# Patient Record
Sex: MALE | Race: OTHER
[De-identification: names, ages, dates, MRNs, and addresses within clinical notes are randomized per-mention and may not be internally consistent; named-entity substitution may affect disease eponyms.]

---

## 2017-03-01 ENCOUNTER — HOSPITAL ENCOUNTER (INPATIENT)
Dept: HOSPITAL 80 - FED | Age: 52
LOS: 9 days | Discharge: HOME | DRG: 674 | End: 2017-03-10
Attending: INTERNAL MEDICINE | Admitting: INTERNAL MEDICINE
Payer: COMMERCIAL

## 2017-03-01 DIAGNOSIS — I12.0: ICD-10-CM

## 2017-03-01 DIAGNOSIS — N18.6: Primary | ICD-10-CM

## 2017-03-01 DIAGNOSIS — D63.8: ICD-10-CM

## 2017-03-01 DIAGNOSIS — Z79.4: ICD-10-CM

## 2017-03-01 DIAGNOSIS — E10.69: ICD-10-CM

## 2017-03-01 DIAGNOSIS — E10.21: ICD-10-CM

## 2017-03-01 DIAGNOSIS — N25.81: ICD-10-CM

## 2017-03-01 LAB
% IMMATURE GRANULYOCYTES: 0.6 % (ref 0–1.1)
ABSOLUTE IMMATURE GRANULOCYTES: 0.06 10^3/UL (ref 0–0.1)
ABSOLUTE NRBC COUNT: 0 10^3/UL (ref 0–0.01)
ADD DIFF?: NO
ADD MORPH?: YES
ADD SCAN?: NO
ALBUMIN SERPL-MCNC: 3.1 G/DL (ref 3.5–5)
ALP SERPL-CCNC: 90 IU/L (ref 38–126)
ALT SERPL-CCNC: 47 IU/L (ref 21–72)
ANION GAP SERPL CALC-SCNC: 19 MEQ/L (ref 8–16)
AST SERPL-CCNC: 44 IU/L (ref 17–59)
ATYPICAL LYMPHOCYTE FLAG: 0 (ref 0–99)
BASE EXCESS BLDA CALC-SCNC: -10.1 MEQ/L (ref -2.5–2.5)
BILIRUB SERPL-MCNC: 0.5 MG/DL (ref 0.1–1.4)
BILIRUBIN-CONJUGATED: 0.5 MG/DL (ref 0–0.5)
BILIRUBIN-UNCONJUGATED: 0 MG/DL (ref 0–1.1)
CALCIUM SERPL-MCNC: 5.1 MG/DL (ref 8.5–10.4)
CHLORIDE SERPL-SCNC: 103 MEQ/L (ref 97–110)
CK-MB INTERPRETATION: NEGATIVE
CMV DNA SERPL NAA+PROBE-ACNC: 96 % (ref 92–95)
CO2 SERPL-SCNC: 15 MEQ/L (ref 22–31)
CREAT SERPL-MCNC: 19 MG/DL (ref 0.7–1.3)
CREATINE KINASE-MB FRACTION: 23.1 NG/ML (ref 0–3.19)
ERYTHROCYTE [DISTWIDTH] IN BLOOD BY AUTOMATED COUNT: 13.6 % (ref 11.5–15.2)
FERRITIN SERPL-MCNC: 273 NG/ML (ref 17.9–464)
FRAGMENT RBC FLAG: 0 (ref 0–99)
GFR SERPL CREATININE-BSD FRML MDRD: 3 ML/MIN/{1.73_M2}
GLUCOSE SERPL-MCNC: 308 MG/DL (ref 70–100)
HCO3 BLD-SCNC: 14 MEQ/L (ref 22–26)
HCT VFR BLD CALC: 18.5 % (ref 40–51)
HGB BLD-MCNC: 6.2 G/DL (ref 13.7–17.5)
HYPOCHROMIA BLD QL SMEAR: (no result)
INR PPP: 1.28 (ref 0.83–1.16)
IRON SERPL-MCNC: 51 MCG/DL (ref 49–199)
LEFT SHIFT FLG: 0 (ref 0–99)
LIPEMIA HEMOLYSIS FLAG: 80 (ref 0–99)
MACROCYTES: (no result)
MCH RBC BLDCO QN: 30.7 PG (ref 27.9–34.1)
MCHC RBC AUTO-ENTMCNC: 33.5 G/DL (ref 32.4–36.7)
MCV RBC AUTO: 91.6 FL (ref 81.5–99.8)
MICROCYTES: (no result)
NRBC-AUTO%: 0 % (ref 0–0.2)
O2 CONCENTRATION LITERS: 2 LITERS
PCO2 BLD: 26 MMHG (ref 34–38)
PLATELET # BLD EST: ADEQUATE 10*3/UL
PLATELET # BLD: 269 10^3/UL (ref 150–400)
PLATELET CLUMPS FLAG: 0 (ref 0–99)
PMV BLD AUTO: 9.3 FL (ref 8.7–11.7)
PO2 BLD: 85 MMHG (ref 65–75)
POTASSIUM SERPL-SCNC: 6.2 MEQ/L (ref 3.5–5.2)
PROT SERPL-MCNC: 6.1 G/DL (ref 6.3–8.2)
PROTHROMBIN TIME: 16 SEC (ref 12–15)
RBC # BLD AUTO: 2.02 10^6/UL (ref 4.4–6.38)
SAO2 % BLD FROM PO2: 23 % (ref 20–55)
SODIUM SERPL-SCNC: 137 MEQ/L (ref 134–144)
TCO2: 15 MEQ/L (ref 23–27)
TIBC SERPL-MCNC: 226 UG/DL (ref 260–490)
TROPONIN I SERPL-MCNC: 0.04 NG/ML (ref 0–0.03)

## 2017-03-01 PROCEDURE — C1750 CATH, HEMODIALYSIS,LONG-TERM: HCPCS

## 2017-03-01 PROCEDURE — P9016 RBC LEUKOCYTES REDUCED: HCPCS

## 2017-03-01 PROCEDURE — G0472 HEP C SCREEN HIGH RISK/OTHER: HCPCS

## 2017-03-01 PROCEDURE — P9021 RED BLOOD CELLS UNIT: HCPCS

## 2017-03-01 NOTE — EDPHY
H & P


Stated Complaint: labs at pc/anemic/leg swelling/weak


Source: Patient, Family, 


Exam Limitations: Language barrier





- Personal History


Current Tetanus/Diphtheria Vaccine: Yes





- Medical/Surgical History


Hx Asthma: No


Hx Chronic Respiratory Disease: No


Hx Diabetes: Yes


Hx Cardiac Disease: No


Hx Renal Disease: Yes


Hx Cirrhosis: No


Hx Alcoholism: No


Hx HIV/AIDS: No


Hx Splenectomy or Spleen Trauma: No


Other PMH: Diabetes





- Social History


Smoking Status: Never smoked


Alcohol Use: None


Drug Use: None


Time Seen by Provider: 03/01/17 17:46


HPI/ROS: 





CHIEF COMPLAINT: Sent by [facility]





HISTORY OF PRESENT ILLNESS:  51-year-old insulin-dependent diabetic presents to 

the emergency department sent by [facility].  Patient was seen yesterday 

for a 5 day history of weakness, leg swelling, fatigue and shortness of breath. 

Patient reports abdominal distention and black stools over the last 5 days.  He 

reports a normal appetite, no nausea or vomiting, no abdominal pain, no 

diarrhea.  Patient feels cold, subjective fevers and chills. No cough.  Patient 

denies drinking alcohol, no drug use, he does not smoke cigarettes.  Patient 

had labs drawn [facility] yesterday and they called him today telling him 

to come to the emergency department.  Patient had a hemoglobin of 6.0 and a 

creatinine of 18.  Potassium was 5.7.  Patient reports difficulty breathing 

with lying flat.





REVIEW OF SYSTEMS:


A comprehensive 10 point review of systems is otherwise negative aside from 

elements mentioned in the history of present illness. (Tonie Guzman)





- Physical Exam


Exam: 





Physical Exam


Gen: Alert and Oriented, short of breath, pale


HEENT: PERRL, dry mucous membranes 


NECK:  No JVD


CV:  Tachycardic rate and regular rhythm


PULM:  Expiratory wheezes bilateral lower lobes


ABDOMEN: soft, non tender to palpation, BS present 


BACK: No CVA tenderness


NEURO:  Neurologically grossly intact 


EXTREMITIES:  Edema bilateral lower extremities


SKIN: no rash or break in skin on exposed skin


PSYCH: answers questions appropriately. (Tonie Guzman)


Constitutional: 


 Initial Vital Signs











Temperature (C)  36.6 C   03/01/17 17:38


 


Heart Rate  104 H  03/01/17 17:38


 


Respiratory Rate  22 H  03/01/17 17:38


 


Blood Pressure  161/106 H  03/01/17 17:38


 


O2 Sat (%)  96   03/01/17 17:38








 











O2 Delivery Mode               Nasal Cannula


 


O2 (L/minute)                  2














Allergies/Adverse Reactions: 


 





No Known Allergies Allergy (Verified 03/01/17 17:38)


 








Home Medications: 














 Medication  Instructions  Recorded


 


Famotidine [Pepcid] 40 mg PO DAILY #20 tab 03/17/12


 


Lisinopril [Zestril 10 mg] 10 mg PO DAILY 03/17/12


 


Prednisone 60 mg PO DAILY #12  03/17/12


 


metFORMIN HCL [Glucophage 500 mg 500 mg PO .ENTER W/MEAL 03/17/12





(*)]  














Medical Decision Making





- Diagnostics


Imaging: 


Chest x-ray independently reviewed by me


Impression: Bibasilar consolidation with cardiomegaly and peribronchial 

thickening, which could be 


related to CHF or bronchitis with effusions and basilar atelectasis/pneumonia. 


 


               Dictated By: Diogo Swain MD 


 (Tonie Guzman)


ED Course/Re-evaluation: 


1900-Report passed on to Dr. Sosa at the end of my shift. 


 (Tonie Guzman)





1804:  I did see and evaluate this patient.  Patient here with acute renal 

failure BUN over 100 creatinine of 12 elevated potassium.  Clinically on exam 

he does have evidence of anasarca and volume overload he is hemodynamically 

stable at this time.


At this time I will be consulting Nephrology.  Will obtain an EKG to evaluate 

for hyperacute T-waves.  Patient be placed on full cardiac monitor for close 

monitoring.  He is in no respiratory distress at this time.  His main complaint 

is generalized weakness and bilateral lower extremity swelling.


 I-STAT blood work reviewed.  Will obtain serum labs, he will be gently hydrated

, will need a Hernandez for strict ins and outs.  Will need to be admitted for 

dialysis.  May need blood transfusion.Patient is still making urine.








Patient also reports black tarry stool.  Will obtain a occult guaic stool and 

do rectal exam.  Patient be typed and screen as he may need blood transfusion 

his hemoglobin is low.











Critical Care:  Total Critical Care Time Spent Managing this Patient: 65 

Minutes.


This time was spent Exclusively with this patient.


This Care was exclusive of procedures.


The Organ System/life at risk was Renal Failure. 


 This Patient was in Critical Condition because renal failure, acidosis, 

electrolyte abnormality, anemia, uremia, hyperglycemia, hypertension





  1819: Spoke with Nephrology: Dr. Bosch, who would like a bladder scan done, 

Hernandez placement, strict in and outs.  Will see and evaluate the patient.  





EKG interpretation by me on record in [facility] system.  Impression time of 

EKG 18 10, this is sinus tachycardia rate of 105, there is no acute peaked T-

waves.  There is no widended of intervals. 








1833: Spoke with Dr. Bosch explained to him that there is only 78 cc in his 

bladder scan.  Hernandez will be placed.  We will treat his hyperkalemia with 1 g 

of calcium, Kayexalate and bicarb.  Chemistry is pending at this time.  Occult 

blood is pending.  Patient be admitted to PCU with full telemetry.  No 

indication the patient needs ICU admission at this time.





1849:  Spoke with the hospitalist service Dr. Real who agrees to admit this 

patient to PCU with full telemetry.  This time patient is hemodynamically 

stable no acute distress. Stable for admission to PCU.  Nephrology has been 

consulted.  Patient's hyperkalemia has been aggressively treated here in the 

emergency room with sodium bicarb, calcium, Kayexalate. (Thor Sosa)





- Data Points


Laboratory Results: 


 Laboratory Results





 03/01/17 17:48 





 03/01/17 17:48 





 











  03/01/17 03/01/17 03/01/17





  18:08 17:48 17:48


 


WBC      





    


 


RBC      





    


 


Hgb      





    


 


Hct      





    


 


MCV      





    


 


MCH      





    


 


MCHC      





    


 


RDW      





    


 


Plt Count      





    


 


MPV      





    


 


Neut % (Auto)      





    


 


Lymph % (Auto)      





    


 


Mono % (Auto)      





    


 


Eos % (Auto)      





    


 


Baso % (Auto)      





    


 


Nucleat RBC Rel Count      





    


 


Absolute Neuts (auto)      





    


 


Absolute Lymphs (auto)      





    


 


Absolute Monos (auto)      





    


 


Absolute Eos (auto)      





    


 


Absolute Basos (auto)      





    


 


Absolute Nucleated RBC      





    


 


Immature Gran %      





    


 


Immature Gran #      





    


 


Platelet Estimate      





    


 


Hypochromasia      





    


 


Microcytic Cells      





    


 


Oval Macrocytes      





    


 


Smear Review By      





    


 


PT      





    


 


INR      





    


 


Sodium      137 mEq/L mEq/L





     (134-144) 


 


Potassium      6.2 mEq/L H mEq/L





     (3.5-5.2) 


 


Chloride      103 mEq/L mEq/L





     () 


 


Carbon Dioxide      15 mEq/l L mEq/l





     (22-31) 


 


Anion Gap      19 mEq/L H mEq/L





     (8-16) 


 


BUN      154 mg/dL H* mg/dL





     (7-23) 


 


Creatinine      19.0 mg/dL H* mg/dL





     (0.7-1.3) 


 


Estimated GFR      3 





    


 


Glucose      308 mg/dL H mg/dL





     () 


 


Calcium      5.1 mg/dL L* mg/dL





     (8.5-10.4) 


 


Total Bilirubin      0.5 mg/dL mg/dL





     (0.1-1.4) 


 


Conjugated Bilirubin      0.5 mg/dL mg/dL





     (0.0-0.5) 


 


Unconjugated Bilirubin      0.0 mg/dL mg/dL





     (0.0-1.1) 


 


AST      44 IU/L IU/L





     (17-59) 


 


ALT      47 IU/L IU/L





     (21-72) 


 


Alkaline Phosphatase      90 IU/L IU/L





     () 


 


Total Protein      6.1 g/dL L g/dL





     (6.3-8.2) 


 


Albumin      3.1 g/dL L g/dL





     (3.5-5.0) 


 


Stool Occult Bld Scrn  NEGATIVE     





   (NEGATIVE)   


 


Patient ABO/Rh    AB POSITIVE   





    


 


Antibody Screen    NEGATIVE   





    














  03/01/17 03/01/17





  17:48 17:48


 


WBC    9.42 10^3/uL 10^3/uL





    (3.80-9.50) 


 


RBC    2.02 10^6/uL L 10^6/uL





    (4.40-6.38) 


 


Hgb    6.2 g/dL L g/dL





    (13.7-17.5) 


 


Hct    18.5 % L %





    (40.0-51.0) 


 


MCV    91.6 fL fL





    (81.5-99.8) 


 


MCH    30.7 pg pg





    (27.9-34.1) 


 


MCHC    33.5 g/dL g/dL





    (32.4-36.7) 


 


RDW    13.6 % %





    (11.5-15.2) 


 


Plt Count    269 10^3/uL 10^3/uL





    (150-400) 


 


MPV    9.3 fL fL





    (8.7-11.7) 


 


Neut % (Auto)    67.4 % %





    (39.3-74.2) 


 


Lymph % (Auto)    11.0 % L %





    (15.0-45.0) 


 


Mono % (Auto)    7.0 % %





    (4.5-13.0) 


 


Eos % (Auto)    13.7 % H %





    (0.6-7.6) 


 


Baso % (Auto)    0.3 % %





    (0.3-1.7) 


 


Nucleat RBC Rel Count    0.0 % %





    (0.0-0.2) 


 


Absolute Neuts (auto)    6.34 10^3/uL 10^3/uL





    (1.70-6.50) 


 


Absolute Lymphs (auto)    1.04 10^3/uL 10^3/uL





    (1.00-3.00) 


 


Absolute Monos (auto)    0.66 10^3/uL 10^3/uL





    (0.30-0.80) 


 


Absolute Eos (auto)    1.29 10^3/uL H 10^3/uL





    (0.03-0.40) 


 


Absolute Basos (auto)    0.03 10^3/uL 10^3/uL





    (0.02-0.10) 


 


Absolute Nucleated RBC    0.00 10^3/uL 10^3/uL





    (0-0.01) 


 


Immature Gran %    0.6 % %





    (0.0-1.1) 


 


Immature Gran #    0.06 10^3/uL 10^3/uL





    (0.00-0.10) 


 


Platelet Estimate    ADEQUATE 





    (ADEQ) 


 


Hypochromasia    1+  H 





   


 


Microcytic Cells    1+  H 





   


 


Oval Macrocytes    1+  H 





   


 


Smear Review By    Pending 





   


 


PT  16.0 SEC H SEC  





   (12.0-15.0)  


 


INR  1.28  H   





   (0.83-1.16)  


 


Sodium    





   


 


Potassium    





   


 


Chloride    





   


 


Carbon Dioxide    





   


 


Anion Gap    





   


 


BUN    





   


 


Creatinine    





   


 


Estimated GFR    





   


 


Glucose    





   


 


Calcium    





   


 


Total Bilirubin    





   


 


Conjugated Bilirubin    





   


 


Unconjugated Bilirubin    





   


 


AST    





   


 


ALT    





   


 


Alkaline Phosphatase    





   


 


Total Protein    





   


 


Albumin    





   


 


Stool Occult Bld Scrn    





   


 


Patient ABO/Rh    





   


 


Antibody Screen    





   











Medications Given: 


 








Discontinued Medications





Sodium Polystyrene Sulfonate (Kayexalate)  15 gm PO EDNOW ONE


   Stop: 03/01/17 18:33


   Last Admin: 03/01/17 18:40 Dose:  15 gm








Departure





- Departure


Disposition: [facility]


Clinical Impression: 


 Hyperkalemia, Uremia





Renal failure, acute


Qualifiers:


 Acute renal failure type: unspecified Qualified Code(s): N17.9 - Acute kidney 

failure, unspecified





Volume overload


Qualifiers:


 Hypervolemia type: other Qualified Code(s): E87.79 - Other fluid overload





Condition: Critical


Referrals: 


[facility] [Other] - As per Instructions

## 2017-03-01 NOTE — GHP
DATE OF ADMISSION:  03/01/2017



CHIEF COMPLAINT:  Acute on suspected chronic renal failure.



HISTORY OF PRESENT ILLNESS:  Patient is a 51-year-old  male with 
history of diabetes, on insulin, hypertension, and hyperlipidemia, presenting 
with 1-week symptoms including fatigue, volume overload, and shortness of 
breath.  Approximately a week ago, he began noticing increased fatigue, nausea, 
vomiting with nonbloody emesis.  He has had increased orthopnea and PND.  He 
feels better when walking around.  He has a small amount of blood if he blows 
his nose.  Denies hematemesis, hematochezia, or hematuria.  Reports black 
stools for 1 week.  He has had normal urinary output.  Has had increased 
itchiness.  Per his wife, he has been more confused over this time period.  
Denies any NSAID use. 

Up to 8 days ago, he was able to play sports with his kids including basketball 
and football.  But is unable to given these new symptoms.  Denies fevers, chills
, or sweats.  Dry cough when lying flat.  Denies chest pain.



REVIEW OF SYSTEMS:  I completed a 10-point review of systems, negative except 
as noted in HPI.



PAST MEDICAL HISTORY:  

1.  Type 1 diabetes 18 years ago, on insulin. 

2.  Hypertension. 

3.  Hyperlipidemia.



FAMILY HISTORY:  Mother with diabetes.



SOCIAL HISTORY:  Lives in [place] with his wife and son.  Works in a [employer] at 
the [employer].  No tobacco, alcohol, or illicits.



MEDICATIONS:  Lantus 55 units in the morning.  He is on an antihypertensive and 
medicine for his lipids but does not know dosage.



ALLERGIES:  Denies.



PAST SURGICAL HISTORY:  None.



PHYSICAL EXAM:  VITAL SIGNS:  Temperature 37.4.  Blood pressure on admission 161
/106, now 164/85.  Heart rate in the 100s, respirations 20, 94% on 2 L, 90 on 
room air.  GENERAL:  Patient is very fatigued, ill appearing, pale.  HEENT:  
AKHIL.  EOMI.  Oropharynx clear.  Conjunctival pallor.  CV:  Tachy, regular.  
No murmurs, gallops, or rubs. Elevated JVD. }+2-3 pitting edema BL legs up to 
thighs  LUNGS:  Diminished at bases.  ABDOMEN:  Distended, soft, nontender.  
Positive bowel sounds.  :  Hernandez in place with clear urine.  No suprapubic 
tenderness.  SKIN:  Warm, dry.  Excoriations scabbed over, no evidence of 
infection.  NEURO:  2-12 intact.  Mild asterixis.  Alert and oriented x3.  Slow 
to answer questions.



LABS:  INR 1.2, PT 16.  Sodium 137, potassium 6.2, chloride 105, carbon dioxide 
15, anion gap 19, , creatinine 19, glucose 308.  Calcium 5.1, phosphorus 
12.7.  Albumin 3.1, total protein 6.1, AST and ALT 44 and 47.  WBC 9, 
hemoglobin 6.2, hematocrit 18, platelets 269.  Fecal occult blood negative.  
EKG personally reviewed by me, normal sinus rhythm, mild T-waves anterior 
leads.  Chest x-ray personally reviewed by me, blunting of costophrenic angles 
as well as mild interstitial edema.



ASSESSMENT/PLAN:  

1.  Acute on presumed chronic kidney disease: suspect this is secondary to 
underlying diabetes, hypertension.  Dr. Méndez with Nephrology has evaluated 
patient this evening.  Will plan for dialysis catheter placement in the morning 
to initiate dialysis.  Will check additional studies including phos, urine 
studies, daily renal function.  Renal US pending. Plan for renal biopsy when 
more stable and less risk for bleeding.

2.  Acute hypoxic respiratory failure: due to volume overload with ARF, but 
will also check TTE. HD initiation tomorrow.  No evidence of pneumonia on x-ray 
or infectious symptoms.

3.  Symptomatic normocytic anemia: likely due to CKD and dilutional anemia with 
significant overload. Has had small amounts of blood with blowing nose and 
black stools. FOBT negative.  Check iron studies. Transfuse 1 unit RBC tonight 
since symptomatic; caution given volume overload. Additional blood in morning 
with dialysis.

4.  Symptomatic uremia:  Nausea, vomiting this week.  P.r.n. antiemetics and 
Benadryl as needed.

5.  Severe hyperkalemia:  Potassium was elevated 6.2 with mild T-waves on EKG.  
Patient received calcium, bicarb, and Kayexalate.  Monitor on telemetry

6. Hypocalcemia: check Vit D and PTH

7. Accelerated HTN: volume contributing. Hydralazine PRN

8. Fatigue: multifactorial, but anemia significant. Plan as above

9. Metabolic acidosis: due to ARF; should improve with HD.

10. Uncontrolled diabetes: did not take Lantus today. Resume tomorrow in AM at 
reduced dose with ARF to avoid hypoglycemia.

11.Diet.  Renal with 2 L fluid restriction.

12.  DVT prophylaxis.  SCDs.

13.  Patient warrants admission to the SCU given severe hyperkalemia placing 
him at risk for arrhythmia.  Monitor on telemetry and plan for HD in the 
morning.





Job #:  [id number]

MTDD

## 2017-03-01 NOTE — GCON
DATE OF CONSULTATION:  03/01/2017



REASON FOR CONSULTATION:  Opinion regarding acute kidney injury.



HISTORY OF PRESENT ILLNESS:  This patient is a very pleasant 51-year-old gentleman with no prior his
tory of chronic kidney disease.  He has had diabetes mellitus type 2 for 18 years.  Over the course 
of the past week or so, the patient complains of increasing fatigue, shortness of breath, particular
ly when lying flat, cough without sputum production, hemoptysis, hematemesis, epistaxis, abdominal p
ain.  He has had some nausea and vomiting, as well as passing black stools for the last couple of da
ys.  He has not had fevers, chills, blurry vision, double vision, headache.  He has had orthopnea, n
o paroxysmal nocturnal dyspnea, palpitations, syncope, diminished urine output, gross hematuria, dys
uria, diarrhea or constipation, rash, arthritis, arthralgias, myalgias, and has not been using aspir
in or nonsteroidal antiinflammatory drugs.  He is currently on no blood thinners.



PAST MEDICAL HISTORY:  

1.  Diabetes mellitus type 2 for 18 years.

2.  Hyperlipidemia.



MEDICATIONS:  He is on 5 medicines, but is not sure what they are.



ALLERGIES:  None.



FAMILY HISTORY:  Positive for diabetes, but negative for renal failure.



SOCIAL HISTORY:  The patient is .  He has 3 children.  He has been  for 20 years.  His
 oldest is 18 and is a senior at [employer].  He works at [employer] in the kitchen.  
He enjoys sports, including soccer, basketball, and baseball.  Up until a week ago, he was very acti
ve.



REVIEW OF SYSTEMS:  A complete 12-point review of systems was reviewed and pertinent positives and n
egatives are as per the previous sections.  He was also complaining of itching over the course of th
e past week.



PHYSICAL EXAMINATION:  GENERAL:  He is awake, alert, cooperative.  He is ill-appearing.  HEENT:  Pup
ils are reactive to light.  Extraocular movements are intact.  Mucous membranes are moist.  NECK:  M
ild JVD, no lymphadenopathy or thyromegaly.  HEART:  Tachycardic, regular, no rub, no S3.  LUNGS:  R
ales and wheezes bilaterally.  ABDOMEN:  Bowel sounds are positive, soft, nontender, nondistended.  
No obvious organomegaly, masses, or bruits.  EXTREMITIES:  Positive for edema.  He has several bruis
es in his lower extremities.  NEUROLOGIC:  No asterixis.  Moves all his extremities.  LYMPHATIC:  No
 palpable lymphadenopathy or lymphedema.  MUSCULOSKELETAL:  No effusions or tenderness.



LABORATORY VALUES:  WBC 9.4, hemoglobin 6.2, hematocrit 18.5, platelet count 269,000, eosinophils ab
solute 1.29, which are elevated, and that is 13.7% overall.  INR 1.28.  PT of 16.0.  Serum sodium 13
7, potassium 6.2, chloride 103, CO2 15, anion gap of 19, , creatinine 19, eGFR of 3 cc/min, g
lucose 308, calcium 5.1, phosphorus 12.7, AST 44, ALT 47, albumin 3.1, total protein 6.1.  Fecal occ
ult blood negative.



DATA REVIEWED:  Chest x-ray was obtained, showing basilar consolidation with cardiomegaly and peribr
onchial thickening, which could be related to either congestive heart failure or bronchitis.  He als
o has basilar atelectasis and/or pneumonia.



IMPRESSION:  

1.  Acute kidney injury, question chronic kidney disease, particularly in light of his serum creatin
ine of 19.

2.  Anemia.

3.  Uremic symptoms but no pericarditis.

4.  Diabetes mellitus type 2.  Blood sugar today is 308.

5.  Tachycardia.



RECOMMENDATIONS:  

1.  Counseled the patient regarding the need of dialysis.  I have explained the risks and benefits, 
and he wishes to proceed.

2.  Will have Radiology place a temporary hemodialysis catheter in the morning and proceed with dial
ysis tomorrow.

3.  I have counseled the patient regarding percutaneous kidney biopsy with its attendant risks and b
enefits, including infection, bleeding, need for blood transfusion, gross hematuria, need for surgic
al or radiologic repair of a damaged kidney, a nephrectomy, and death.  I have counseled the patient
 that I wound not do percutaneous kidney biopsy on someone who has been taking aspirin or other anti
coagulants, and we do need to know what his medications at home have been.  Also, he is at increased
 risk of bleeding with a hemoglobin of 6.2; he will need transfusions.  It sounds like he will be ge
tting a unit of blood tonight and will plan on giving him 2 units of blood on dialysis tomorrow.

4.  We will work him up for possible glomerulonephritis or paraproteinemia.

5.  Work up his anemia.

6.  We will continue to follow his electrolytes, volume status, and renal function.

7.  All questions were answered to his, his son's, and his wife's satisfaction.

8.  The patient speaks very little English, and I speak no Citizen of [place], and so all of the interview was 
done via an . 

Thank you for allowing me to participate in the case of your patient.  If there are any questions, jillian ribera do not hesitate to contact us.  We will be following along with you.





Job #:  [id number]

## 2017-03-01 NOTE — CPEKG
Heart Rate: 105

RR Interval: 571

P-R Interval: 120

QRSD Interval: 86

QT Interval: 384

QTC Interval: 508

P Axis: 73

QRS Axis: 78

T Wave Axis: 20

EKG Severity - ABNORMAL ECG -

EKG Impression: SINUS TACHYCARDIA

EKG Impression: PROLONGED QT INTERVAL

Electronically Signed By: Rafael Matta 03-Mar-2017 14:43:42

## 2017-03-02 LAB
% IMMATURE GRANULYOCYTES: 0.5 % (ref 0–1.1)
ABSOLUTE IMMATURE GRANULOCYTES: 0.05 10^3/UL (ref 0–0.1)
ABSOLUTE NRBC COUNT: 0 10^3/UL (ref 0–0.01)
ADD DIFF?: NO
ADD MORPH?: YES
ADD SCAN?: NO
ALBUMIN SERPL-MCNC: 2.4 G/DL (ref 3.5–5)
ALBUMIN SERPL-MCNC: 2.6 G/DL (ref 3.5–5)
ANA SER QL: 0.13 UNITS (ref ?–1)
ANION GAP SERPL CALC-SCNC: 15 MEQ/L (ref 8–16)
ANION GAP SERPL CALC-SCNC: 19 MEQ/L (ref 8–16)
ATYPICAL LYMPHOCYTE FLAG: 0 (ref 0–99)
CALCIUM SERPL-MCNC: 5.1 MG/DL (ref 8.5–10.4)
CALCIUM SERPL-MCNC: 5.7 MG/DL (ref 8.5–10.4)
CHLORIDE SERPL-SCNC: 103 MEQ/L (ref 97–110)
CHLORIDE SERPL-SCNC: 106 MEQ/L (ref 97–110)
CO2 SERPL-SCNC: 16 MEQ/L (ref 22–31)
CO2 SERPL-SCNC: 18 MEQ/L (ref 22–31)
COLOR UR: (no result)
CREAT SERPL-MCNC: 14.6 MG/DL (ref 0.7–1.3)
CREAT SERPL-MCNC: 19 MG/DL (ref 0.7–1.3)
EOSMR EOSINOPHILS: (no result)
EOSMR EPITHELIAL CELLS: (no result)
EOSMR PMNS: (no result)
EOSMR RBCS: (no result)
ERYTHROCYTE [DISTWIDTH] IN BLOOD BY AUTOMATED COUNT: 13.5 % (ref 11.5–15.2)
FERRITIN SERPL-MCNC: 230 NG/ML (ref 17.9–464)
FRAGMENT RBC FLAG: 0 (ref 0–99)
GFR SERPL CREATININE-BSD FRML MDRD: 3 ML/MIN/{1.73_M2}
GFR SERPL CREATININE-BSD FRML MDRD: 4 ML/MIN/{1.73_M2}
GLUCOSE SERPL-MCNC: 137 MG/DL (ref 70–100)
GLUCOSE SERPL-MCNC: 147 MG/DL (ref 70–100)
HBV SURFACE AB SER-ACNC: 0 M[IU]/ML
HCT VFR BLD CALC: 16.2 % (ref 40–51)
HCT VFR BLD CALC: 25.1 % (ref 40–51)
HGB BLD-MCNC: 5.4 G/DL (ref 13.7–17.5)
HGB BLD-MCNC: 8.5 G/DL (ref 13.7–17.5)
HYALINE CASTS #/AREA URNS LPF: (no result) /LPF (ref 0–1)
HYPOCHROMIA BLD QL SMEAR: (no result)
LEFT SHIFT FLG: 0 (ref 0–99)
LIPEMIA HEMOLYSIS FLAG: 80 (ref 0–99)
MCH RBC BLDCO QN: 31 PG (ref 27.9–34.1)
MCHC RBC AUTO-ENTMCNC: 33.3 G/DL (ref 32.4–36.7)
MCV RBC AUTO: 93.1 FL (ref 81.5–99.8)
MICROCYTES: (no result)
MUCOUS THREADS #/AREA URNS LPF: (no result) /LPF
NITRITE UR QL STRIP: NEGATIVE
NRBC-AUTO%: 0 % (ref 0–0.2)
PH UR STRIP: 5 [PH] (ref 5–7.5)
PLATELET # BLD EST: ADEQUATE 10*3/UL
PLATELET # BLD: 220 10^3/UL (ref 150–400)
PLATELET CLUMPS FLAG: 0 (ref 0–99)
PMV BLD AUTO: 9.3 FL (ref 8.7–11.7)
POTASSIUM SERPL-SCNC: 4.2 MEQ/L (ref 3.5–5.2)
POTASSIUM SERPL-SCNC: 5.6 MEQ/L (ref 3.5–5.2)
PTH INTACT NO MINERALS: 659.9 PG/ML (ref 10.8–79.4)
RBC # BLD AUTO: 1.74 10^6/UL (ref 4.4–6.38)
RBC #/AREA URNS HPF: (no result) /HPF (ref 0–3)
SODIUM SERPL-SCNC: 136 MEQ/L (ref 134–144)
SODIUM SERPL-SCNC: 141 MEQ/L (ref 134–144)
SP GR UR STRIP: 1.01 (ref 1–1.03)
TROPONIN I SERPL-MCNC: 0.07 NG/ML (ref 0–0.03)
URATE SERPL-MCNC: 10.1 MG/DL (ref 3.5–8.5)
VITAMIN D 25-HYDROXY TOTAL: < 12.8 NG/ML (ref 30–100)
WBC #/AREA URNS HPF: (no result) /HPF (ref 0–3)

## 2017-03-02 PROCEDURE — 30233N1 TRANSFUSION OF NONAUTOLOGOUS RED BLOOD CELLS INTO PERIPHERAL VEIN, PERCUTANEOUS APPROACH: ICD-10-PCS | Performed by: INTERNAL MEDICINE

## 2017-03-02 PROCEDURE — 02HV33Z INSERTION OF INFUSION DEVICE INTO SUPERIOR VENA CAVA, PERCUTANEOUS APPROACH: ICD-10-PCS | Performed by: RADIOLOGY

## 2017-03-02 PROCEDURE — 5A1D60Z: ICD-10-PCS | Performed by: INTERNAL MEDICINE

## 2017-03-02 RX ADMIN — METOPROLOL TARTRATE SCH MG: 100 TABLET, FILM COATED ORAL at 16:34

## 2017-03-02 RX ADMIN — INSULIN LISPRO SCH: 100 INJECTION, SOLUTION INTRAVENOUS; SUBCUTANEOUS at 18:05

## 2017-03-02 RX ADMIN — DEXTROSE MONOHYDRATE PRN GM: 25 INJECTION, SOLUTION INTRAVENOUS at 20:29

## 2017-03-02 RX ADMIN — DEXTROSE MONOHYDRATE SCH MLS: 10 INJECTION, SOLUTION INTRAVENOUS at 21:00

## 2017-03-02 RX ADMIN — DEXTROSE MONOHYDRATE PRN GM: 25 INJECTION, SOLUTION INTRAVENOUS at 20:04

## 2017-03-02 RX ADMIN — CALCIUM ACETATE SCH MG: 667 CAPSULE ORAL at 17:20

## 2017-03-02 RX ADMIN — INSULIN ASPART SCH UNITS: 100 INJECTION, SUSPENSION SUBCUTANEOUS at 17:20

## 2017-03-02 RX ADMIN — METOPROLOL TARTRATE SCH MG: 100 TABLET, FILM COATED ORAL at 21:24

## 2017-03-02 RX ADMIN — INSULIN LISPRO SCH: 100 INJECTION, SOLUTION INTRAVENOUS; SUBCUTANEOUS at 09:22

## 2017-03-02 RX ADMIN — INSULIN LISPRO SCH: 100 INJECTION, SOLUTION INTRAVENOUS; SUBCUTANEOUS at 12:46

## 2017-03-02 RX ADMIN — DEXTROSE MONOHYDRATE PRN GM: 25 INJECTION, SOLUTION INTRAVENOUS at 23:13

## 2017-03-02 RX ADMIN — HEPARIN SODIUM SCH UNIT: 5000 INJECTION, SOLUTION INTRAVENOUS; SUBCUTANEOUS at 21:24

## 2017-03-02 RX ADMIN — ATORVASTATIN CALCIUM SCH MG: 40 TABLET, FILM COATED ORAL at 21:24

## 2017-03-02 RX ADMIN — CALCIUM ACETATE SCH MG: 667 CAPSULE ORAL at 14:34

## 2017-03-02 RX ADMIN — DEXTROSE MONOHYDRATE PRN GM: 25 INJECTION, SOLUTION INTRAVENOUS at 21:08

## 2017-03-02 RX ADMIN — METOPROLOL TARTRATE SCH: 100 TABLET, FILM COATED ORAL at 13:29

## 2017-03-02 NOTE — GCON
PULMONARY/CRITICAL CARE CONSULTATION



DATE OF CONSULTATION:  03/02/2017



REFERRING PHYSICIAN:  Aaron Kimble MD



REASON FOR CONSULTATION:  Evaluation and management of anemia and edema.



HISTORY:  The patient is a 51-year-old male with a longstanding history of type 1 diabetes and hyper
tension, who was admitted to the hospital yesterday with a 1-week history of fatigue, shortness of b
reath and edema.  He had some nausea and vomiting at the beginning of this set of symptoms.  During 
the last few days he started to have orthopnea and pruritus, and also began to get a bit more confus
ed.  He denies any fevers or chills.  He has no chest pain.



PAST MEDICAL HISTORY:  

1.  Type 1 diabetes, diagnosed 18 years ago, on insulin.

2.  Hypertension.

3.  Hyperlipidemia.



MEDICATIONS:  Lantus 55 units in the morning.  He is also on Lasix, Tradjenta, atorvastatin and meto
prolol.



ALLERGIES:  None.



SOCIAL HISTORY:  The patient lives in [place] with his wife and son.  He works at [employer].  He
 denies any smoking or alcohol.



FAMILY HISTORY:  Positive for diabetes in his mother.



REVIEW OF SYSTEMS:  Complete review of systems adds nothing to the history of present illness.



PHYSICAL EXAMINATION:  GENERAL:  The patient is awake, alert and in no acute distress at rest, lying
 in bed.  VITAL SIGNS:  His blood pressure is 154/81 with a pulse of 93.  He is afebrile.  Oxygen sa
turations 100% on 2 L.  HEENT:  Normocephalic and atraumatic.  No icterus.  NECK:  No JVD.  Trachea 
is midline.  CHEST:  Clear to auscultation.  CARDIAC:  Regular rate and rhythm without murmur.  ABDO
MEN:  Soft, nontender.  Bowel sounds are present.  EXTREMITIES:  No clubbing or cyanosis.  He has 1+
 anasarca.



LABORATORY:  Sodium is 141, with potassium of 5.6.  BUN is 155 with a creatinine of 19.0.  Calcium i
s 5.1, PTH is 660.  Hemoglobin is 5.4, down from 6.2.  White blood count is 9.7.  INR is 1.3.  Arter
ial blood gas shows a pH of 7.35, with a pO2 of 85, a CO2 of 26, and a bicarbonate of 15 on 2 L of o
xygen.  Anion gap is 19.  



Urinalysis shows 3+ protein, and 15-25 red blood cells, with 5-10 white blood cells.  There is 3+ gl
ucose.  



Chest x-ray shows basilar consolidation with cardiomegaly, early changes of pulmonary edema.  Images
 reviewed.  



Ultrasound of the abdomen and pelvis demonstrates no hydronephrosis with echogenic kidneys.



ASSESSMENT:  

1.  Acute renal failure.  This is likely acute on chronic with risks factors of diabetes and hyperte
nsion.  The markedly elevated creatinine and anemia suggests that this may have been going on for qu
ite a while.  He presented with fluid overload.  He has just been diuresed and transfused, and is fe
eling a bit better, with less shortness of breath and a bit less swelling.

2.  Anemia.  The patient has normocytic anemia with normal iron level and iron saturation.  His TIBC
 is low at 226.  This most likely is due to chronic renal insufficiency with reduced Epogen producti
on related to renal disease.  Acute volume expansion could contribute.  There is no history of acute
 blood loss.  The patient does not have iron deficiency.  He has received 3 units of packed red bloo
d cells.  A repeat H and H is pending.

3.  Dyspnea and edema.  This is likely due to fluid overload.  He feels a bit better, although he ha
s only had 1-1/2 L of fluid taken off by his first dialysis run.

4.  Hypertension.  The patient has chronic hypertension and is hypertensive now.  He usually takes m
etoprolol, but has not been given this yet.



RECOMMENDATIONS:  

1.  Repeat H and H.  

2.  Resume metoprolol to help with hypertension. 

3.  The patient will be dialyzed again tomorrow.  

4.  Consideration is being given to doing a renal biopsy.





Job #:  [id number]

## 2017-03-02 NOTE — HOSPPROG
Hospitalist Progress Note


Assessment/Plan: 





* acute on most likely chronic kidney disease


*  urine with possibly active sediment


*   plans for possible biopsy in the next few days


*  getting dialysis





* anemia


*  normal iron studies


*  will check guaiacs


*  getting 2 units of packed red blood cells





* type 1 diabetes


*  restart home dosing of insulin





* hypertension


*  restart beta-blocker





* hypocalcemia


* secondary hyperparathyroidism


* vitamin-D deficient


* DVT prophylaxis - heparin


Subjective: feels okay.  No new complaints


Objective: 


 Vital Signs











Temp Pulse Resp BP Pulse Ox


 


 36.7 C   93   14   154/81 H  100 


 


 03/02/17 08:00  03/02/17 14:00  03/02/17 14:00  03/02/17 14:00  03/02/17 14:00








 Laboratory Results





 03/02/17 05:45 





 03/02/17 05:45 





 











 03/01/17 03/02/17 03/03/17





 05:59 05:59 05:59


 


Intake Total  600 


 


Output Total  550 


 


Balance  50 








 











PT  16.0 SEC (12.0-15.0)  H  03/01/17  17:48    


 


INR  1.28  (0.83-1.16)  H  03/01/17  17:48    














- Physical Exam


Constitutional: no apparent distress, appears nourished, not in pain


Eyes: anicteric sclera, EOMI


Ears, Nose, Mouth, Throat: moist mucous membranes, hearing normal


Cardiovascular: regular rate and rhythym, no murmur, rub, or gallop, edema ( 1+)


Respiratory: no respiratory distress, no rales or rhonchi, clear to auscultation


Gastrointestinal: normoactive bowel sounds, soft, non-tender abdomen, no 

palpable masses


Skin: warm


Neurologic: AAOx3


Psychiatric: interacting appropriately, not anxious, not encephalopathic, 

thought process linear





ICD10 Worksheet


Patient Problems: 


 Problems











Problem Status Onset


 


Hyperkalemia Acute  


 


Renal failure, acute Acute  


 


Uremia Acute  


 


Volume overload Acute

## 2017-03-02 NOTE — SOAPPROG
SOAP Progress Note


Assessment/Plan: 


Assessment/Plan:





CHELA: likely has CKD given his longstanding diabetes as well as elevated PTH, 

although previous Cr and renal history unknown.  Pt with some uremic symptoms, 

presenting with Cr of 19.


 - First HD today.


 - HD again tomorrow.


 - Serological workup pending.


 - Renal US reviewed, no hydronephrosis.


 - Will continue to discuss with him regarding renal biopsy, he is still a bit 

confused to process.  Will first dialyze through the weekend and discuss again 

when mental status improved.


 - Avoid MOM, morphine, demerol, NSAIDs, contrast, aminoglycosides, fleets, ACEI

/ARB, and other nephrotoxins.


 - Please avoid giving any NSAIDs or blood thinners in case biopsy is pursued 

next week.





Anemia: Hgb down to 5.4.  Getting 2 units PRBCs today on HD.  Will continue to 

monitor.





Hyperkalemia: will modulate on HD.





Metabolic acidosis: will modulate on HD.





DOMINGUEZ: Pt with elevated PTH >600, phos of 12, and corrected calcium of around 6.1.


 - Will modulate on HD to reduce phos and increase calcium.


 - Will start on calcium containing phos binder.


 - Would be cautious about correcting calcium too quickly in setting of high 

phos.


 - Will continue to monitor.








Subjective: 





No acute events overnight.  Pt had dialysis catheter placed this am.  He is now 

starting to be transfused first unit PRBCs.  He states he is breathing 

comfortably, only pain is at catheter site.


Objective: 





 Vital Signs











Temp Pulse Resp BP Pulse Ox


 


 36.7 C   103 H  17   158/86 H  93 


 


 03/02/17 08:00  03/02/17 08:00  03/02/17 08:00  03/02/17 08:00  03/02/17 08:00








 Laboratory Results





 03/02/17 05:45 





 03/02/17 05:45 





 











 03/01/17 03/02/17 03/03/17





 05:59 05:59 05:59


 


Intake Total  600 


 


Output Total  550 


 


Balance  50 








 











PT  16.0 SEC (12.0-15.0)  H  03/01/17  17:48    


 


INR  1.28  (0.83-1.16)  H  03/01/17  17:48    








General: alert and oriented, no acute distress


Eyes; EOMI, PERRL


OP: Clear


CV: RRR


Resp: nonlabored respirations on NC


Abd: Soft, NT


Ext: +trace edema BLE


Neuro: CN II-XII grossly intact


Psych: cooperative, appropriate mood and affect, slightly confused


Access: RIJ catheter





ICD10 Worksheet


Patient Problems: 


 Problems











Problem Status Onset


 


Hyperkalemia Acute  


 


Renal failure, acute Acute  


 


Uremia Acute  


 


Volume overload Acute

## 2017-03-02 NOTE — ECHO
[id number]

[id number]



+---------------------------------------------------+      [address]

:                                                   :       [place] [address]

:                                                   :           [phone]

+---------------------------------------------------+       Fax [phone]



                       Adult Echocardiographic Report



+----------------------------------------------------------------------------

------------+

:Name: Denzel YOON Date: 2017 07:54 AM                 

            :

:MRN: [id number]             Hospital Admission Number: [id number] L

ocation: 243:

:: 1965             Gender: Male                           Height: 6

2 in        :

:Age: 51 yrs                 Race: [facility]                              Weight: 2

01 lb       :

:Reason For Study: Eval LV Fx                                                

            :

:                                                                   BSA: 1.9 

meters2     :

:History: Fluid overload, CHF                                                

            :

+----------------------------------------------------------------------------

------------+

MMode/2D Measurements \T\ Calculations

IVSd: 0.95 cm  LVIDd: 5.1 cm  FS: 36.7 %             Ao root diam: 3.5 cm

LVPWd: 1.1 cm  LVIDs: 3.2 cm  EDV(Teich): 125.2 ml   ACS: 2.0 cm

                              ESV(Teich): 42.4 ml    LA dimension: 4.8 cm

                              EF(Teich): 66.2 %



Normal Measurement Values:

+----------------------------------------------------------------------------

----------------------------------+

:LVIDd (3.5-5.7cm)    IVSd (0.6-1.1cm)     LVPWd (0.6-1.1cm)     Aortic Root 

(2.0-3.7cm)Left Atrium (1.5-4.0cm):

:LV Vol(d) (76-115ml) LV Vol(s) (29-48ml)  Ejec Fraction (50-65%)PV Luis Enrique (0.6-

1.2m/s)    TV Luis Enrique (0.4-1.0m/s)    :

:MV E Luis Enrique (0.8-1.0m/s)MV A Luis Enrique (0.3-1.0m/s)LVOT Luis Enrique (0.7-1.2m/s) Asc Ao Luis Enrique (

0.9-1.8m/s)                       :

+----------------------------------------------------------------------------

----------------------------------+

Doppler Measurements \T\ Calculations

MV E max luis enrique:       Ao V2 max:         LV V1 max:        MR max luis enrique:

124.4 cm/sec        158.7 cm/sec       104.1 cm/sec      459.4 cm/sec

MV A max luis enrique:       Ao max PG:         LV V1 max PG:     MR max P.2 cm/sec        10.1 mmHg          4.3 mmHg          84.4 mmHg

MV E/A: 1.2

        _____________________________________________________________



PA V2 max:          TR max luis enrique:

112.7 cm/sec        325.8 cm/sec

PA max P.1 mmHg TR max P.5 mmHg

                    RAP systole:

                    5.0 mmHg

                    RVSP(TR):

                    47.5 mmHg



Left Ventricle

The left ventricle is normal in size. There is normal left ventricular wall

thickness. The left ventricular ejection fraction is normal. There is

Doppler evidence for diastolic dysfunction. Ejection Fraction = 66%.

Elevated LV filling pressures. The left ventricular wall motion is normal.





Right Ventricle

The right ventricle is normal in size and function.



Atria

The left atrial size is normal. Right atrial size is normal.





Mitral Valve

The mitral valve is normal in structure and function. There is no evidence

of mitral valve prolapse. There is no mitral valve stenosis. There is trace

mitral regurgitation.



Tricuspid Valve

Normal tricuspid valve. There is mild tricuspid regurgitation. Unable to

assess PA systolic pressure.



Aortic Valve

The aortic valve is normal in structure and function. The aortic valve is

trileaflet. There is no aortic stenosis. There is no aortic insufficiency.



Pulmonic Valve

The pulmonic valve is normal in structure and function. There is no pulmonic

valvular regurgitation.



Great Vessels

The aortic root is normal size.





Pericardium/Pleural

There is no pericardial effusion.



Conclusion

A complete two-dimensional transthoracic echocardiogram was performed (2D,

M-mode, Doppler and color flow Doppler).

The left ventricular ejection fraction is normal.

Ejection Fraction = 66%.

The left ventricular wall motion is normal.

There is Doppler evidence for diastolic dysfunction.

Elevated LV filling pressures

The mitral valve is normal in structure and function.

There is trace mitral regurgitation.

There is mild tricuspid regurgitation.

Unable to assess PA systolic pressure

The aortic valve is normal in structure and function.

The aortic valve is trileaflet.

There is no pericardial effusion.



_____________________________________________________________________________







Final Reading Physician:

                        Dr Yenifer Castro  electronically signed on 2017

                        11:34 AM

Ordering Physician: Bryant Méndez

Performed By: Hussain Bray, LINACS

## 2017-03-03 LAB
% IMMATURE GRANULYOCYTES: 0.5 % (ref 0–1.1)
ABSOLUTE IMMATURE GRANULOCYTES: 0.05 10^3/UL (ref 0–0.1)
ABSOLUTE NRBC COUNT: 0 10^3/UL (ref 0–0.01)
ADD DIFF?: NO
ADD MORPH?: NO
ADD SCAN?: NO
ALBUMIN SERPL-MCNC: 2.7 G/DL (ref 3.5–5)
ANION GAP SERPL CALC-SCNC: 16 MEQ/L (ref 8–16)
ATYPICAL LYMPHOCYTE FLAG: 0 (ref 0–99)
CALCIUM SERPL-MCNC: 5.6 MG/DL (ref 8.5–10.4)
CHLORIDE SERPL-SCNC: 103 MEQ/L (ref 97–110)
CO2 SERPL-SCNC: 19 MEQ/L (ref 22–31)
CREAT SERPL-MCNC: 15 MG/DL (ref 0.7–1.3)
ERYTHROCYTE [DISTWIDTH] IN BLOOD BY AUTOMATED COUNT: 14.3 % (ref 11.5–15.2)
EST. AVERAGE GLUCOSE BLD GHB EST-MCNC: 148 MG/DL (ref 68–126)
FRAGMENT RBC FLAG: 0 (ref 0–99)
GBM AB SER QL: <0.2 U
GFR SERPL CREATININE-BSD FRML MDRD: 3 ML/MIN/{1.73_M2}
GLUCOSE SERPL-MCNC: 84 MG/DL (ref 70–100)
HAPTOGLOB SERPL-MCNC: 335 MG/DL (ref 30–200)
HBA1C MFR BLD: 6.8 % (ref 4–6)
HCT VFR BLD CALC: 26.2 % (ref 40–51)
HGB BLD-MCNC: 9.1 G/DL (ref 13.7–17.5)
LEFT SHIFT FLG: 0 (ref 0–99)
LIPEMIA HEMOLYSIS FLAG: 90 (ref 0–99)
Lab: (no result)
MCH RBC BLDCO QN: 30.3 PG (ref 27.9–34.1)
MCHC RBC AUTO-ENTMCNC: 34.7 G/DL (ref 32.4–36.7)
MCV RBC AUTO: 87.3 FL (ref 81.5–99.8)
NRBC-AUTO%: 0 % (ref 0–0.2)
PLATELET # BLD: 206 10^3/UL (ref 150–400)
PLATELET CLUMPS FLAG: 0 (ref 0–99)
PMV BLD AUTO: 9.7 FL (ref 8.7–11.7)
POTASSIUM SERPL-SCNC: 4.5 MEQ/L (ref 3.5–5.2)
RBC # BLD AUTO: 3 10^6/UL (ref 4.4–6.38)
SODIUM SERPL-SCNC: 138 MEQ/L (ref 134–144)

## 2017-03-03 RX ADMIN — INSULIN LISPRO SCH: 100 INJECTION, SOLUTION INTRAVENOUS; SUBCUTANEOUS at 17:17

## 2017-03-03 RX ADMIN — DEXTROSE MONOHYDRATE PRN GM: 25 INJECTION, SOLUTION INTRAVENOUS at 07:35

## 2017-03-03 RX ADMIN — CALCIUM ACETATE SCH MG: 667 CAPSULE ORAL at 17:16

## 2017-03-03 RX ADMIN — INSULIN ASPART SCH: 100 INJECTION, SUSPENSION SUBCUTANEOUS at 08:43

## 2017-03-03 RX ADMIN — INSULIN LISPRO SCH: 100 INJECTION, SOLUTION INTRAVENOUS; SUBCUTANEOUS at 11:50

## 2017-03-03 RX ADMIN — CALCIUM ACETATE SCH MG: 667 CAPSULE ORAL at 11:41

## 2017-03-03 RX ADMIN — DEXTROSE MONOHYDRATE SCH MLS: 10 INJECTION, SOLUTION INTRAVENOUS at 16:57

## 2017-03-03 RX ADMIN — DEXTROSE MONOHYDRATE PRN GM: 25 INJECTION, SOLUTION INTRAVENOUS at 00:20

## 2017-03-03 RX ADMIN — ATORVASTATIN CALCIUM SCH MG: 40 TABLET, FILM COATED ORAL at 20:58

## 2017-03-03 RX ADMIN — HEPARIN SODIUM SCH UNIT: 5000 INJECTION, SOLUTION INTRAVENOUS; SUBCUTANEOUS at 20:57

## 2017-03-03 RX ADMIN — METOPROLOL TARTRATE SCH MG: 100 TABLET, FILM COATED ORAL at 20:57

## 2017-03-03 RX ADMIN — METOPROLOL TARTRATE SCH MG: 100 TABLET, FILM COATED ORAL at 09:19

## 2017-03-03 RX ADMIN — CALCIUM ACETATE SCH MG: 667 CAPSULE ORAL at 09:18

## 2017-03-03 RX ADMIN — HEPARIN SODIUM SCH UNIT: 5000 INJECTION, SOLUTION INTRAVENOUS; SUBCUTANEOUS at 13:46

## 2017-03-03 RX ADMIN — ERYTHROPOIETIN SCH UNIT: 10000 INJECTION, SOLUTION INTRAVENOUS; SUBCUTANEOUS at 14:59

## 2017-03-03 RX ADMIN — INSULIN LISPRO SCH: 100 INJECTION, SOLUTION INTRAVENOUS; SUBCUTANEOUS at 08:43

## 2017-03-03 RX ADMIN — CALCITRIOL SCH MCG: 0.25 CAPSULE ORAL at 16:53

## 2017-03-03 RX ADMIN — DEXTROSE MONOHYDRATE PRN GM: 25 INJECTION, SOLUTION INTRAVENOUS at 02:07

## 2017-03-03 RX ADMIN — DEXTROSE MONOHYDRATE PRN GM: 25 INJECTION, SOLUTION INTRAVENOUS at 05:12

## 2017-03-03 RX ADMIN — HEPARIN SODIUM SCH UNIT: 5000 INJECTION, SOLUTION INTRAVENOUS; SUBCUTANEOUS at 05:11

## 2017-03-03 NOTE — HOSPPROG
Hospitalist Progress Note


Assessment/Plan: 





* acute on most likely chronic kidney disease


*  urine with possibly active sediment


*   plans for possible biopsy in the next few days


*  getting dialysis





* anemia


*  normal iron studies


*  will check guaiacs


*  s/p 3 units of packed red blood cells





* type 1 diabetes


*  decrease insulin





* hypertension


*  restart beta-blocker





* hypocalcemia


* secondary hyperparathyroidism


* vitamin-D deficient


* DVT prophylaxis - heparin


Subjective: was hypoglycemic overnight.  says has been only taking 10-20 units 

of insulin daily rather than the 55 that was recorded on med rec


Objective: 


 Vital Signs











Temp Pulse Resp BP Pulse Ox


 


 36.5 C   75   14   175/82 H  95 


 


 03/03/17 07:41  03/03/17 18:00  03/03/17 18:00  03/03/17 18:00  03/03/17 18:00








 Laboratory Results





 03/03/17 05:50 





 03/03/17 05:50 





 











 03/02/17 03/03/17 03/04/17





 05:59 05:59 05:59


 


Intake Total 600 1764 1198


 


Output Total 550 900 


 


Balance 50 864 1198








 











PT  16.0 SEC (12.0-15.0)  H  03/01/17  17:48    


 


INR  1.28  (0.83-1.16)  H  03/01/17  17:48    














- Physical Exam


Constitutional: no apparent distress, appears nourished, not in pain


Eyes: anicteric sclera, EOMI


Ears, Nose, Mouth, Throat: moist mucous membranes, hearing normal, ears appear 

normal, no oral mucosal ulcers


Cardiovascular: regular rate and rhythym, no murmur, rub, or gallop, edema (1+)


Respiratory: no respiratory distress, no rales or rhonchi, clear to auscultation


Gastrointestinal: normoactive bowel sounds, soft, non-tender abdomen, no 

palpable masses


Skin: warm


Neurologic: AAOx3


Psychiatric: interacting appropriately, not anxious, not encephalopathic, 

thought process linear





ICD10 Worksheet


Patient Problems: 


 Problems











Problem Status Onset


 


Hyperkalemia Acute  


 


Renal failure, acute Acute  


 


Uremia Acute  


 


Volume overload Acute

## 2017-03-03 NOTE — SOAPPROG
SOAP Progress Note


Assessment/Plan: 


Assessment:


1. Renal fx. Acute on chronic vs all chronic. DM vs GN vs other. Serologies 

pending.


Dialyze tomorrow, Sunday, anticipate biopsy Monday.


May be ESRD although kidneys were not atrophic on u/s.


2. Anemia. S/p PRBC tx yesterday. Improved. Give procrit.


3. Secondary hyperparathyroidism. P improving with HD. Phoslo with meals. 


PTH > 600. Can start calcitriol.


4. Edema. UF with dialysis. 




















Plan:





03/03/17 13:56





03/03/17 14:00





03/03/17 14:01





03/03/17 14:02





Subjective: 





Feels a little nauseated.


Had line placed, 1st HD yesterday. Seen and examined on dialysis today.


Objective: 





 Vital Signs











Temp Pulse Resp BP Pulse Ox


 


 36.5 C   73   15   157/96 H  97 


 


 03/03/17 07:41  03/03/17 12:00  03/03/17 12:00  03/03/17 12:00  03/03/17 12:00








 Laboratory Results





 03/03/17 05:50 





 03/03/17 05:50 





 











 03/02/17 03/03/17 03/04/17





 05:59 05:59 05:59


 


Intake Total 600 1764 


 


Output Total 550 900 


 


Balance 50 864 








 











PT  16.0 SEC (12.0-15.0)  H  03/01/17  17:48    


 


INR  1.28  (0.83-1.16)  H  03/01/17  17:48    








Comfortable, in bed, on dialysis


Qb 250, UF goal 2 L net


RRR, no m/g/r


CTAB


Abdom soft, nt


1+ LE edema





ICD10 Worksheet


Patient Problems: 


 Problems











Problem Status Onset


 


Renal failure, acute Acute  


 


Hyperkalemia Acute  


 


Volume overload Acute  


 


Uremia Acute

## 2017-03-03 NOTE — PDINTPN
Intensivist Progress Note


Assessment/Plan: 


Assessment:


CHELA/CRF: S/P HD, with improved lytes, but markedly elevated BUN/Cr persist, as 

expected.


DM: Hypoglycemiec overnight after getting his "usual" insulin dose, which he 

now states he hasn't been taking recently. BSs improved with D10, amps of D50, 

and increasing PO.


Edema: Due to fluid overload. Improved.


Anemia: H/H improved with transfusion/fluid removal. No signs of acute blood 

loss.








Plan: HD today. Continue close monitoring of BSs. May be able to transfer to 

floor later today after dialysis if BSs OK. Follow H/H





03/03/17 11:39





03/03/17 11:43





Subjective: 





Feels better, slept last night. Appetite fair. Denies dyspnea, pain


Objective: 





 Vital Signs











Temp Pulse Resp BP Pulse Ox


 


 36.5 C   79   15   161/89 H  100 


 


 03/03/17 07:41  03/03/17 10:00  03/03/17 10:00  03/03/17 10:00  03/03/17 10:00








 Laboratory Results





 03/03/17 05:50 





 03/03/17 05:50 





 











 03/02/17 03/03/17 03/04/17





 05:59 05:59 05:59


 


Intake Total 600 1764 


 


Output Total 550 900 


 


Balance 50 864 








 











PT  16.0 SEC (12.0-15.0)  H  03/01/17  17:48    


 


INR  1.28  (0.83-1.16)  H  03/01/17  17:48    














Physical Exam





- Physical Exam


General Appearance: alert, no apparent distress


EENT: normal ENT inspection


Neck: normal inspection


Respiratory: chest non-tender, lungs clear


Cardiac/Chest: regular rate, rhythm, edema (1+)


Abdomen: normal bowel sounds, non-tender


Skin: normal color, warm/dry


Extremities: normal inspection


Neuro/Psych: alert, normal mood/affect, oriented x 3





ICD10 Worksheet


Patient Problems: 


 Problems











Problem Status Onset


 


Hyperkalemia Acute  


 


Renal failure, acute Acute  


 


Uremia Acute  


 


Volume overload Acute

## 2017-03-04 LAB
% IMMATURE GRANULYOCYTES: 0.7 % (ref 0–1.1)
ABSOLUTE IMMATURE GRANULOCYTES: 0.06 10^3/UL (ref 0–0.1)
ABSOLUTE NRBC COUNT: 0 10^3/UL (ref 0–0.01)
ADD DIFF?: NO
ADD MORPH?: NO
ADD SCAN?: NO
ALBUMIN SERPL-MCNC: 2.3 G/DL (ref 3.5–5)
ANION GAP SERPL CALC-SCNC: 10 MEQ/L (ref 8–16)
ATYPICAL LYMPHOCYTE FLAG: 0 (ref 0–99)
CALCIUM SERPL-MCNC: 6.1 MG/DL (ref 8.5–10.4)
CHLORIDE SERPL-SCNC: 99 MEQ/L (ref 97–110)
CO2 SERPL-SCNC: 22 MEQ/L (ref 22–31)
CREAT SERPL-MCNC: 10.4 MG/DL (ref 0.7–1.3)
ERYTHROCYTE [DISTWIDTH] IN BLOOD BY AUTOMATED COUNT: 13.8 % (ref 11.5–15.2)
FRAGMENT RBC FLAG: 0 (ref 0–99)
GFR SERPL CREATININE-BSD FRML MDRD: 5 ML/MIN/{1.73_M2}
GLUCOSE SERPL-MCNC: 225 MG/DL (ref 70–100)
HCT VFR BLD CALC: 26.7 % (ref 40–51)
HGB BLD-MCNC: 9.2 G/DL (ref 13.7–17.5)
LEFT SHIFT FLG: 0 (ref 0–99)
LIPEMIA HEMOLYSIS FLAG: 90 (ref 0–99)
Lab: 0.92 %
Lab: 11 %
Lab: 11 %
Lab: 21 %
Lab: 48 %
Lab: 9 %
MCH RBC BLDCO QN: 30 PG (ref 27.9–34.1)
MCHC RBC AUTO-ENTMCNC: 34.5 G/DL (ref 32.4–36.7)
MCV RBC AUTO: 87 FL (ref 81.5–99.8)
NRBC-AUTO%: 0 % (ref 0–0.2)
PLATELET # BLD: 189 10^3/UL (ref 150–400)
PLATELET CLUMPS FLAG: 0 (ref 0–99)
PMV BLD AUTO: 10 FL (ref 8.7–11.7)
POTASSIUM SERPL-SCNC: 4.2 MEQ/L (ref 3.5–5.2)
RBC # BLD AUTO: 3.07 10^6/UL (ref 4.4–6.38)
SODIUM SERPL-SCNC: 131 MEQ/L (ref 134–144)

## 2017-03-04 RX ADMIN — INSULIN HUMAN SCH UNITS: 100 INJECTION, SUSPENSION SUBCUTANEOUS at 19:18

## 2017-03-04 RX ADMIN — CALCIUM ACETATE SCH MG: 667 CAPSULE ORAL at 13:10

## 2017-03-04 RX ADMIN — CALCIUM ACETATE SCH MG: 667 CAPSULE ORAL at 09:13

## 2017-03-04 RX ADMIN — CALCIUM ACETATE SCH MG: 667 CAPSULE ORAL at 19:18

## 2017-03-04 RX ADMIN — INSULIN HUMAN SCH UNITS: 100 INJECTION, SUSPENSION SUBCUTANEOUS at 09:32

## 2017-03-04 RX ADMIN — HEPARIN SODIUM SCH UNIT: 5000 INJECTION, SOLUTION INTRAVENOUS; SUBCUTANEOUS at 15:58

## 2017-03-04 RX ADMIN — INSULIN HUMAN SCH: 100 INJECTION, SUSPENSION SUBCUTANEOUS at 09:33

## 2017-03-04 RX ADMIN — INSULIN LISPRO SCH UNITS: 100 INJECTION, SOLUTION INTRAVENOUS; SUBCUTANEOUS at 13:10

## 2017-03-04 RX ADMIN — METOPROLOL TARTRATE SCH MG: 100 TABLET, FILM COATED ORAL at 19:18

## 2017-03-04 RX ADMIN — INSULIN LISPRO SCH: 100 INJECTION, SOLUTION INTRAVENOUS; SUBCUTANEOUS at 19:18

## 2017-03-04 RX ADMIN — ATORVASTATIN CALCIUM SCH MG: 40 TABLET, FILM COATED ORAL at 19:18

## 2017-03-04 RX ADMIN — INSULIN LISPRO SCH UNITS: 100 INJECTION, SOLUTION INTRAVENOUS; SUBCUTANEOUS at 09:13

## 2017-03-04 RX ADMIN — Medication PRN APP: at 15:58

## 2017-03-04 RX ADMIN — HEPARIN SODIUM SCH UNIT: 5000 INJECTION, SOLUTION INTRAVENOUS; SUBCUTANEOUS at 22:11

## 2017-03-04 RX ADMIN — METOPROLOL TARTRATE SCH MG: 100 TABLET, FILM COATED ORAL at 09:13

## 2017-03-04 RX ADMIN — HEPARIN SODIUM SCH UNIT: 5000 INJECTION, SOLUTION INTRAVENOUS; SUBCUTANEOUS at 06:21

## 2017-03-04 NOTE — SOAPPROG
SOAP Progress Note


Assessment/Plan: 


Assessment:


1. Renal fx. Acute on chronic vs all chronic. DM vs GN vs other. Serologies 

pending.


Can rest from HD tomorrow, anticipate biopsy and next HD Monday if BP better 

controlled.


May be ESRD although kidneys were not atrophic on u/s.


2. Anemia. S/p PRBC. Improved. Gave procrit.


3. Secondary hyperparathyroidism. P improving with HD. Phoslo with meals. 


PTH > 600. Started calcitriol.


4. Edema. UF with dialysis. 


5. HTN. Initiate amlodipine.




















Plan:





03/03/17 13:56





03/03/17 14:00





03/03/17 14:01





03/03/17 14:02





03/04/17 13:55





03/04/17 13:56





Subjective: 





Pt seen and examined on dialysis.


Had nausea necessitating reduction in UF to 1 L goal.


Had dialysis yesterday with nausea as well.


Feels fine now. Has some itching.


Objective: 





 Vital Signs











Temp Pulse Resp BP Pulse Ox


 


 36.8 C   72   20   167/93 H  92 


 


 03/04/17 12:00  03/04/17 12:00  03/04/17 12:00  03/04/17 12:00  03/04/17 12:00








 Microbiology











 03/02/17 01:53 Urine Culture - Final





 Urine,Clean Catch 








 Laboratory Results





 03/04/17 04:11 





 03/04/17 04:11 





 











 03/03/17 03/04/17 03/05/17





 05:59 05:59 05:59


 


Intake Total 1764 2498 


 


Output Total 900  


 


Balance 864 2498 








 











PT  16.0 SEC (12.0-15.0)  H  03/01/17  17:48    


 


INR  1.28  (0.83-1.16)  H  03/01/17  17:48    








On dialysis


Qb 250 /98


On dialysis


RRR, no m/g/r


CTAB


Abdom soft, nt


2+ LE edema





ICD10 Worksheet


Patient Problems: 


 Problems











Problem Status Onset


 


Renal failure, acute Acute  


 


Hyperkalemia Acute  


 


Volume overload Acute  


 


Uremia Acute

## 2017-03-04 NOTE — HOSPPROG
Hospitalist Progress Note


Assessment/Plan: 





* acute on most likely chronic kidney disease


*  urine with possibly active sediment


*   plans for possible biopsy in the next few days


*  getting dialysis





* anemia


*  normal iron studies


*  will check guaiacs


*  s/p 3 units of packed red blood cells





* type 1 diabetes


*  decrease insulin





* hypertension


*  restart beta-blocker





* hypocalcemia


* secondary hyperparathyroidism


* vitamin-D deficient


* DVT prophylaxis - heparin


Subjective: no new complaints. feels better


Objective: 


 Vital Signs











Temp Pulse Resp BP Pulse Ox


 


 36.8 C   72   20   167/93 H  92 


 


 03/04/17 12:00  03/04/17 12:00  03/04/17 12:00  03/04/17 12:00  03/04/17 12:00








 Microbiology











 03/02/17 01:53 Urine Culture - Final





 Urine,Clean Catch 








 Laboratory Results





 03/04/17 04:11 





 03/04/17 04:11 





 











 03/03/17 03/04/17 03/05/17





 05:59 05:59 05:59


 


Intake Total 1764 2498 


 


Output Total 900  


 


Balance 864 2498 








 











PT  16.0 SEC (12.0-15.0)  H  03/01/17  17:48    


 


INR  1.28  (0.83-1.16)  H  03/01/17  17:48    














- Physical Exam


Constitutional: no apparent distress, appears nourished, not in pain


Eyes: anicteric sclera, EOMI


Ears, Nose, Mouth, Throat: moist mucous membranes, hearing normal, ears appear 

normal


Cardiovascular: regular rate and rhythym, no murmur, rub, or gallop, edema (2+)


Respiratory: no respiratory distress, no rales or rhonchi, clear to auscultation


Gastrointestinal: normoactive bowel sounds, soft, non-tender abdomen, no 

palpable masses


Skin: warm


Neurologic: AAOx3


Psychiatric: interacting appropriately, not anxious, not encephalopathic, 

thought process linear





ICD10 Worksheet


Patient Problems: 


 Problems











Problem Status Onset


 


Hyperkalemia Acute  


 


Renal failure, acute Acute  


 


Uremia Acute  


 


Volume overload Acute

## 2017-03-05 LAB
% IMMATURE GRANULYOCYTES: 0.8 % (ref 0–1.1)
ABSOLUTE IMMATURE GRANULOCYTES: 0.07 10^3/UL (ref 0–0.1)
ABSOLUTE NRBC COUNT: 0 10^3/UL (ref 0–0.01)
ADD DIFF?: NO
ADD MORPH?: NO
ADD SCAN?: NO
ALBUMIN SERPL-MCNC: 2.3 G/DL (ref 3.5–5)
ANION GAP SERPL CALC-SCNC: 6 MEQ/L (ref 8–16)
ATYPICAL LYMPHOCYTE FLAG: 0 (ref 0–99)
CALCIUM SERPL-MCNC: 6.8 MG/DL (ref 8.5–10.4)
CHLORIDE SERPL-SCNC: 101 MEQ/L (ref 97–110)
CO2 SERPL-SCNC: 26 MEQ/L (ref 22–31)
CREAT SERPL-MCNC: 8.6 MG/DL (ref 0.7–1.3)
ERYTHROCYTE [DISTWIDTH] IN BLOOD BY AUTOMATED COUNT: 13.2 % (ref 11.5–15.2)
FRAGMENT RBC FLAG: 0 (ref 0–99)
GFR SERPL CREATININE-BSD FRML MDRD: 7 ML/MIN/{1.73_M2}
GLUCOSE SERPL-MCNC: 75 MG/DL (ref 70–100)
HCT VFR BLD CALC: 26.2 % (ref 40–51)
HGB BLD-MCNC: 9 G/DL (ref 13.7–17.5)
LEFT SHIFT FLG: 0 (ref 0–99)
LIPEMIA HEMOLYSIS FLAG: 90 (ref 0–99)
MCH RBC BLDCO QN: 30.5 PG (ref 27.9–34.1)
MCHC RBC AUTO-ENTMCNC: 34.4 G/DL (ref 32.4–36.7)
MCV RBC AUTO: 88.8 FL (ref 81.5–99.8)
NRBC-AUTO%: 0 % (ref 0–0.2)
PLATELET # BLD: 167 10^3/UL (ref 150–400)
PLATELET CLUMPS FLAG: 20 (ref 0–99)
PMV BLD AUTO: 9.7 FL (ref 8.7–11.7)
POTASSIUM SERPL-SCNC: 4.3 MEQ/L (ref 3.5–5.2)
RBC # BLD AUTO: 2.95 10^6/UL (ref 4.4–6.38)
SODIUM SERPL-SCNC: 133 MEQ/L (ref 134–144)

## 2017-03-05 RX ADMIN — CALCIUM ACETATE SCH MG: 667 CAPSULE ORAL at 09:43

## 2017-03-05 RX ADMIN — METOPROLOL TARTRATE SCH MG: 100 TABLET, FILM COATED ORAL at 09:43

## 2017-03-05 RX ADMIN — METOPROLOL TARTRATE SCH MG: 100 TABLET, FILM COATED ORAL at 21:24

## 2017-03-05 RX ADMIN — INSULIN LISPRO SCH: 100 INJECTION, SOLUTION INTRAVENOUS; SUBCUTANEOUS at 09:42

## 2017-03-05 RX ADMIN — CALCIUM ACETATE SCH MG: 667 CAPSULE ORAL at 17:54

## 2017-03-05 RX ADMIN — INSULIN LISPRO SCH UNITS: 100 INJECTION, SOLUTION INTRAVENOUS; SUBCUTANEOUS at 17:54

## 2017-03-05 RX ADMIN — INSULIN LISPRO SCH: 100 INJECTION, SOLUTION INTRAVENOUS; SUBCUTANEOUS at 16:09

## 2017-03-05 RX ADMIN — INSULIN HUMAN SCH UNITS: 100 INJECTION, SUSPENSION SUBCUTANEOUS at 17:53

## 2017-03-05 RX ADMIN — HEPARIN SODIUM SCH UNIT: 5000 INJECTION, SOLUTION INTRAVENOUS; SUBCUTANEOUS at 06:00

## 2017-03-05 RX ADMIN — CALCIUM ACETATE SCH: 667 CAPSULE ORAL at 16:08

## 2017-03-05 RX ADMIN — HEPARIN SODIUM SCH UNIT: 5000 INJECTION, SOLUTION INTRAVENOUS; SUBCUTANEOUS at 21:24

## 2017-03-05 RX ADMIN — ATORVASTATIN CALCIUM SCH MG: 40 TABLET, FILM COATED ORAL at 21:24

## 2017-03-05 RX ADMIN — INSULIN HUMAN SCH UNITS: 100 INJECTION, SUSPENSION SUBCUTANEOUS at 09:43

## 2017-03-05 RX ADMIN — HEPARIN SODIUM SCH UNIT: 5000 INJECTION, SOLUTION INTRAVENOUS; SUBCUTANEOUS at 17:53

## 2017-03-05 NOTE — HOSPPROG
Hospitalist Progress Note


Assessment/Plan: 





* acute on most likely chronic kidney disease


*  urine with possibly active sediment


*   plans for possible biopsy  tomorrow I believe


*  getting dialysis





* anemia


*  normal iron studies


*  will check guaiacs


*  s/p 3 units of packed red blood cells





* type 1 diabetes


*  blood sugars better with decrease in insulin





* hypertension


*   continue beta-blocker


*  amlodipine added yesterday


* hypocalcemia


* secondary hyperparathyroidism


* vitamin-D deficient


* DVT prophylaxis - heparin


Subjective: no new complaints.  Some slight itching


Objective: 


 Vital Signs











Temp Pulse Resp BP Pulse Ox


 


 36.8 C   78   19   161/94 H  91 L


 


 03/05/17 08:00  03/05/17 08:00  03/05/17 08:00  03/05/17 12:07  03/05/17 08:00








 Microbiology











 03/02/17 01:53 Urine Culture - Final





 Urine,Clean Catch 








 Laboratory Results





 03/05/17 04:25 





 03/05/17 04:25 





 











 03/04/17 03/05/17 03/06/17





 05:59 05:59 05:59


 


Intake Total 2498 725 


 


Balance 2498 725 








 











PT  16.0 SEC (12.0-15.0)  H  03/01/17  17:48    


 


INR  1.28  (0.83-1.16)  H  03/01/17  17:48    














- Physical Exam


Constitutional: no apparent distress, appears nourished, not in pain


Eyes: anicteric sclera, EOMI


Ears, Nose, Mouth, Throat: moist mucous membranes, hearing normal, ears appear 

normal


Cardiovascular: regular rate and rhythym, no murmur, rub, or gallop, edema (1+)


Respiratory: no respiratory distress, no rales or rhonchi, clear to auscultation


Gastrointestinal: normoactive bowel sounds, soft, non-tender abdomen, no 

palpable masses


Neurologic: AAOx3


Psychiatric: interacting appropriately, not anxious, not encephalopathic, 

thought process linear





ICD10 Worksheet


Patient Problems: 


 Problems











Problem Status Onset


 


Hyperkalemia Acute  


 


Renal failure, acute Acute  


 


Uremia Acute  


 


Volume overload Acute

## 2017-03-05 NOTE — SOAPPROG
SOAP Progress Note


Assessment/Plan: 


Assessment:


1. Renal fx. Acute on chronic vs all chronic. DM vs GN vs other. Serologies 

including anti GBM, Hep B/C/HIV, SIFE, MPO/PR3, ERICK negative.


Plan HD tomorrow, anticipate biopsy Tuesday as long as BP better controlled.


Very likely he is ESRD although kidneys were not atrophic on u/s.


Will need to verify benefits for outpatient dialysis tomorrow.


2. Anemia. S/p PRBC. Improved. Gave procrit.


3. Secondary hyperparathyroidism. P improving with HD. Phoslo with meals. Ca 

improving.


PTH > 600. Started calcitriol.


4. Edema. UF with dialysis. 


5. HTN. Increase amlodipine, UF on dialysis. Would like SBP <160 for biopsy.




















Plan:





03/03/17 13:56





03/03/17 14:00





03/03/17 14:01





03/03/17 14:02





03/04/17 13:55





03/04/17 13:56





03/05/17 16:08





03/05/17 16:10





Subjective: 





Vomited with dialysis again yesterday.


No complaints today. Appetite very good.


Objective: 





 Vital Signs











Temp Pulse Resp BP Pulse Ox


 


 36.8 C   78   19   161/94 H  91 L


 


 03/05/17 08:00  03/05/17 08:00  03/05/17 08:00  03/05/17 12:07  03/05/17 08:00








 Microbiology











 03/02/17 01:53 Urine Culture - Final





 Urine,Clean Catch 








 Laboratory Results





 03/05/17 04:25 





 03/05/17 04:25 





 











 03/04/17 03/05/17 03/06/17





 05:59 05:59 05:59


 


Intake Total 2498 725 


 


Balance 2498 725 








 











PT  16.0 SEC (12.0-15.0)  H  03/01/17  17:48    


 


INR  1.28  (0.83-1.16)  H  03/01/17  17:48    








Comfortable, in bed


RRR, no m/g/r


CTAB


Abdom soft, nt


2+ LE edema








ICD10 Worksheet


Patient Problems: 


 Problems











Problem Status Onset


 


Renal failure, acute Acute  


 


Hyperkalemia Acute  


 


Volume overload Acute  


 


Uremia Acute

## 2017-03-06 LAB
% IMMATURE GRANULYOCYTES: 0.9 % (ref 0–1.1)
ABSOLUTE IMMATURE GRANULOCYTES: 0.09 10^3/UL (ref 0–0.1)
ABSOLUTE NRBC COUNT: 0 10^3/UL (ref 0–0.01)
ADD DIFF?: NO
ADD MORPH?: NO
ADD SCAN?: NO
ALBUMIN SERPL-MCNC: 2.3 G/DL (ref 3.5–5)
ANION GAP SERPL CALC-SCNC: 10 MEQ/L (ref 8–16)
APTT BLD: 31.1 SEC (ref 23–38)
ATYPICAL LYMPHOCYTE FLAG: 0 (ref 0–99)
CALCIUM SERPL-MCNC: 6.7 MG/DL (ref 8.5–10.4)
CHLORIDE SERPL-SCNC: 102 MEQ/L (ref 97–110)
CO2 SERPL-SCNC: 23 MEQ/L (ref 22–31)
CREAT SERPL-MCNC: 9.8 MG/DL (ref 0.7–1.3)
ERYTHROCYTE [DISTWIDTH] IN BLOOD BY AUTOMATED COUNT: 13.2 % (ref 11.5–15.2)
FRAGMENT RBC FLAG: 0 (ref 0–99)
GFR SERPL CREATININE-BSD FRML MDRD: 6 ML/MIN/{1.73_M2}
GLUCOSE SERPL-MCNC: 114 MG/DL (ref 70–100)
HCT VFR BLD CALC: 26.6 % (ref 40–51)
HGB BLD-MCNC: 9 G/DL (ref 13.7–17.5)
INR PPP: 1.09 (ref 0.83–1.16)
LEFT SHIFT FLG: 10 (ref 0–99)
LIPEMIA HEMOLYSIS FLAG: 90 (ref 0–99)
MCH RBC BLDCO QN: 29.8 PG (ref 27.9–34.1)
MCHC RBC AUTO-ENTMCNC: 33.8 G/DL (ref 32.4–36.7)
MCV RBC AUTO: 88.1 FL (ref 81.5–99.8)
NRBC-AUTO%: 0 % (ref 0–0.2)
PLATELET # BLD: 166 10^3/UL (ref 150–400)
PLATELET CLUMPS FLAG: 0 (ref 0–99)
PMV BLD AUTO: 9.7 FL (ref 8.7–11.7)
POTASSIUM SERPL-SCNC: 5 MEQ/L (ref 3.5–5.2)
PROTHROMBIN TIME: 14 SEC (ref 12–15)
RBC # BLD AUTO: 3.02 10^6/UL (ref 4.4–6.38)
SODIUM SERPL-SCNC: 135 MEQ/L (ref 134–144)

## 2017-03-06 RX ADMIN — INSULIN LISPRO SCH UNITS: 100 INJECTION, SOLUTION INTRAVENOUS; SUBCUTANEOUS at 18:11

## 2017-03-06 RX ADMIN — METOPROLOL TARTRATE SCH MG: 100 TABLET, FILM COATED ORAL at 08:13

## 2017-03-06 RX ADMIN — HEPARIN SODIUM SCH UNIT: 5000 INJECTION, SOLUTION INTRAVENOUS; SUBCUTANEOUS at 15:32

## 2017-03-06 RX ADMIN — INSULIN LISPRO SCH: 100 INJECTION, SOLUTION INTRAVENOUS; SUBCUTANEOUS at 08:15

## 2017-03-06 RX ADMIN — CALCITRIOL SCH MCG: 0.25 CAPSULE ORAL at 08:13

## 2017-03-06 RX ADMIN — ATORVASTATIN CALCIUM SCH MG: 40 TABLET, FILM COATED ORAL at 19:58

## 2017-03-06 RX ADMIN — METOPROLOL TARTRATE SCH MG: 100 TABLET, FILM COATED ORAL at 19:58

## 2017-03-06 RX ADMIN — HEPARIN SODIUM SCH UNIT: 5000 INJECTION, SOLUTION INTRAVENOUS; SUBCUTANEOUS at 06:24

## 2017-03-06 RX ADMIN — CALCIUM ACETATE SCH MG: 667 CAPSULE ORAL at 18:11

## 2017-03-06 RX ADMIN — INSULIN LISPRO SCH UNITS: 100 INJECTION, SOLUTION INTRAVENOUS; SUBCUTANEOUS at 15:32

## 2017-03-06 RX ADMIN — INSULIN HUMAN SCH UNITS: 100 INJECTION, SUSPENSION SUBCUTANEOUS at 17:39

## 2017-03-06 RX ADMIN — CALCIUM ACETATE SCH MG: 667 CAPSULE ORAL at 08:12

## 2017-03-06 RX ADMIN — CALCIUM ACETATE SCH MG: 667 CAPSULE ORAL at 15:31

## 2017-03-06 RX ADMIN — INSULIN HUMAN SCH UNITS: 100 INJECTION, SUSPENSION SUBCUTANEOUS at 08:15

## 2017-03-06 NOTE — HOSPPROG
Hospitalist Progress Note


Assessment/Plan: 





* acute on most likely chronic kidney disease


*  urine with possibly active sediment


*   plans for possible biopsy  tomorrow


*  getting dialysis





* anemia


*  normal iron studies


*  will check guaiacs


*  s/p 3 units of packed red blood cells





* type 1 diabetes


*  blood sugars better with decrease in insulin





* hypertension


*   continue beta-blocker


*   more medications added by Nephrology


* hypocalcemia


* secondary hyperparathyroidism


* vitamin-D deficient


* DVT prophylaxis -  hold heparin for biopsy


Subjective: no new complaints.  No nausea with dialysis


Objective: 


 Vital Signs











Temp Pulse Resp BP Pulse Ox


 


 36.8 C   68   18   169/91 H  94 


 


 03/06/17 15:34  03/06/17 15:34  03/06/17 15:34  03/06/17 15:34  03/06/17 15:34








 Laboratory Results





 03/06/17 04:13 





 03/06/17 04:13 





 











 03/05/17 03/06/17 03/07/17





 05:59 05:59 05:59


 


Intake Total 725 300 


 


Balance 725 300 








 











PT  14.0 SEC (12.0-15.0)   03/06/17  11:16    


 


INR  1.09  (0.83-1.16)   03/06/17  11:16    








 discussed with Nephrology





- Physical Exam


Constitutional: no apparent distress, appears nourished, not in pain


Eyes: anicteric sclera, EOMI


Ears, Nose, Mouth, Throat: moist mucous membranes, hearing normal


Cardiovascular: regular rate and rhythym, no murmur, rub, or gallop


Respiratory: no respiratory distress, no rales or rhonchi, clear to auscultation


Gastrointestinal: normoactive bowel sounds, soft, non-tender abdomen, no 

palpable masses


Skin: warm


Neurologic: AAOx3


Psychiatric: interacting appropriately, not anxious, not encephalopathic, 

thought process linear





ICD10 Worksheet


Patient Problems: 


 Problems











Problem Status Onset


 


Hyperkalemia Acute  


 


Renal failure, acute Acute  


 


Uremia Acute  


 


Volume overload Acute

## 2017-03-06 NOTE — SOAPPROG
SOAP Progress Note


Assessment/Plan: 


Assessment/Plan:





CHELA: likely has CKD given his longstanding diabetes as well as elevated PTH, 

although previous Cr and renal history unknown.  Could be ESRD although kidneys 

do not appear atrophic on renal US.  Serological workup negative thus far.


 - HD again today, will continue MWF for now.


 - Will order renal biopsy for tomorrow.


 - Renal US reviewed, no hydronephrosis.


 - Avoid MOM, morphine, demerol, NSAIDs, contrast, aminoglycosides, fleets, ACEI

/ARB, and other nephrotoxins.


 - Please avoid giving any NSAIDs or blood thinners while renal biopsy being 

pursued.





Anemia: improved after being transfused last week, Hgb now stable at 9, giving 

epo weekly.





HTN: uncontrolled.


 - Will continue metoprolol.


 - Will increase po hydralazine to 25mg po TID.


 - Continue prn hydralazine.


 - Will change amlodipine to BID nifedipine.


 - Will continue to monitor.





DOMINGUEZ: Pt with elevated PTH >600, phos of 12, and corrected calcium of around 

6.1.  Calcium now improving with corrected up to 8, phos down to 6 range.


 - Will continue HD.


 - Will continue calcitriol.


 - Will continue calcium acetate with meals.


 - Will continue to monitor.











Subjective: 





No acute events overnight.  Pt feeling much better overall, swelling improved, 

a little weak but hoping to walk more today.


Objective: 





 Vital Signs











Temp Pulse Resp BP Pulse Ox


 


 37.1 C   80   18   186/96 H  95 


 


 03/06/17 08:00  03/06/17 08:00  03/06/17 08:00  03/06/17 08:13  03/06/17 08:00








 Laboratory Results





 03/06/17 04:13 





 03/06/17 04:13 





 











 03/05/17 03/06/17 03/07/17





 05:59 05:59 05:59


 


Intake Total 725 300 


 


Balance 725 300 








 











PT  16.0 SEC (12.0-15.0)  H  03/01/17  17:48    


 


INR  1.28  (0.83-1.16)  H  03/01/17  17:48    








General: alert and oriented, no acute distress, sitting up on side of bed


Eyes; EOMI, PERRL, sclerae nonicteric


OP: Clear


CV: RRR


Resp: nonlabored respirations


Abd; Soft, NT


Ext: +1 edema BLE


Neuro: CN II-XII grossly intact, no asterixis


Psych; Cooperative, appropriate mood and affect


Access: RIJ temp cath





ICD10 Worksheet


Patient Problems: 


 Problems











Problem Status Onset


 


Hyperkalemia Acute  


 


Renal failure, acute Acute  


 


Uremia Acute  


 


Volume overload Acute

## 2017-03-07 LAB
ALBUMIN SERPL-MCNC: 2.4 G/DL (ref 3.5–5)
ANION GAP SERPL CALC-SCNC: 7 MEQ/L (ref 8–16)
CALCIUM SERPL-MCNC: 7.2 MG/DL (ref 8.5–10.4)
CHLORIDE SERPL-SCNC: 101 MEQ/L (ref 97–110)
CO2 SERPL-SCNC: 26 MEQ/L (ref 22–31)
CREAT SERPL-MCNC: 7.8 MG/DL (ref 0.7–1.3)
GFR SERPL CREATININE-BSD FRML MDRD: 7 ML/MIN/{1.73_M2}
GLUCOSE SERPL-MCNC: 161 MG/DL (ref 70–100)
HCT VFR BLD CALC: 24.4 % (ref 40–51)
HCT VFR BLD CALC: 24.6 % (ref 40–51)
HCT VFR BLD CALC: 25.5 % (ref 40–51)
HGB BLD-MCNC: 8.2 G/DL (ref 13.7–17.5)
HGB BLD-MCNC: 8.3 G/DL (ref 13.7–17.5)
HGB BLD-MCNC: 8.6 G/DL (ref 13.7–17.5)
POTASSIUM SERPL-SCNC: 5 MEQ/L (ref 3.5–5.2)
SODIUM SERPL-SCNC: 134 MEQ/L (ref 134–144)

## 2017-03-07 PROCEDURE — 0TB13ZX EXCISION OF LEFT KIDNEY, PERCUTANEOUS APPROACH, DIAGNOSTIC: ICD-10-PCS | Performed by: RADIOLOGY

## 2017-03-07 RX ADMIN — ATORVASTATIN CALCIUM SCH MG: 40 TABLET, FILM COATED ORAL at 21:18

## 2017-03-07 RX ADMIN — Medication PRN APP: at 10:06

## 2017-03-07 RX ADMIN — CALCIUM ACETATE SCH MG: 667 CAPSULE ORAL at 18:22

## 2017-03-07 RX ADMIN — CALCIUM ACETATE SCH MG: 667 CAPSULE ORAL at 14:22

## 2017-03-07 RX ADMIN — METOPROLOL TARTRATE SCH MG: 100 TABLET, FILM COATED ORAL at 08:13

## 2017-03-07 RX ADMIN — INSULIN HUMAN SCH UNITS: 100 INJECTION, SUSPENSION SUBCUTANEOUS at 18:22

## 2017-03-07 RX ADMIN — INSULIN LISPRO SCH UNITS: 100 INJECTION, SOLUTION INTRAVENOUS; SUBCUTANEOUS at 14:21

## 2017-03-07 RX ADMIN — INSULIN HUMAN SCH: 100 INJECTION, SUSPENSION SUBCUTANEOUS at 08:40

## 2017-03-07 RX ADMIN — INSULIN LISPRO SCH: 100 INJECTION, SOLUTION INTRAVENOUS; SUBCUTANEOUS at 08:14

## 2017-03-07 RX ADMIN — INSULIN LISPRO SCH UNITS: 100 INJECTION, SOLUTION INTRAVENOUS; SUBCUTANEOUS at 18:23

## 2017-03-07 RX ADMIN — METOPROLOL TARTRATE SCH MG: 100 TABLET, FILM COATED ORAL at 21:18

## 2017-03-07 RX ADMIN — CALCIUM ACETATE SCH: 667 CAPSULE ORAL at 13:12

## 2017-03-07 RX ADMIN — INSULIN LISPRO SCH UNITS: 100 INJECTION, SOLUTION INTRAVENOUS; SUBCUTANEOUS at 18:42

## 2017-03-07 RX ADMIN — INSULIN LISPRO SCH: 100 INJECTION, SOLUTION INTRAVENOUS; SUBCUTANEOUS at 13:12

## 2017-03-07 RX ADMIN — CALCIUM ACETATE SCH: 667 CAPSULE ORAL at 08:14

## 2017-03-07 NOTE — SOAPPROG
SOAP Progress Note


Assessment/Plan: 


Assessment:


1. arf/crf: longstanding dm, serologies negative. s/p renal bx today, 

anticipate results by end of day tomorrow. Most likely esrd due to dm +/- other 

glomerular process such as IgA. Will need outpt hd arranged and temp hd cath 

converted to tunneled cath prior to d/c. Will hd tomorrow, assuming bx shows 

esrd as expected would convert cath to tunneled. Have asked outpt dialysis 

personnel to confirm insurance coverage for esrd.





2. htn: meds adjusted, will uf more aggressively on hd tomorrow.





3. edema: uf on hd as above.























Plan:





03/07/17 14:37





Subjective: 





s/p renal bx earlier today. Denies flank pain or gross hematuria.


Objective: 





 Vital Signs











Temp Pulse Resp BP Pulse Ox


 


 36.4 C   72   16   143/83 H  987 H


 


 03/07/17 13:36  03/07/17 13:36  03/07/17 13:36  03/07/17 13:36  03/07/17 13:36








 Microbiology











 03/01/17 23:20 Blood Culture - Final





 Blood 


 


 03/01/17 22:48 Blood Culture - Final





 Blood 








 Laboratory Results





 03/07/17 14:00 





 03/07/17 03:39 





 











 03/06/17 03/07/17 03/08/17





 05:59 05:59 05:59


 


Intake Total 300 650 


 


Output Total  1 


 


Balance 300 649 








 











PT  14.0 SEC (12.0-15.0)   03/06/17  11:16    


 


INR  1.09  (0.83-1.16)   03/06/17  11:16    














Physical Exam





- Physical Exam


General Appearance: no apparent distress


Respiratory: lungs clear


Cardiac/Chest: regular rate, rhythm, other (no rub)


Extremities: pedal edema





ICD10 Worksheet


Patient Problems: 


 Problems











Problem Status Onset


 


Renal failure, acute Acute  


 


Hyperkalemia Acute  


 


Volume overload Acute  


 


Uremia Acute

## 2017-03-07 NOTE — HOSPPROG
Hospitalist Progress Note


Assessment/Plan: 


Assessment:  51-year-old male presents with acute kidney injury on chronic 

kidney disease





Plan:





# CHELA on CKD. Suspect evolving ESRD in setting of DM1 and HTN


- monitoring Hgb level post-biopsy


- s/p biopsy, results tomorrow


- if e/o ESRD, will place tunneled cath and discharge s/p HD


- d/w Dr. Tate, he is confirming w/ outpt dialysis ctr patient's insurance 

status 





# Metabolic acidosis. Acute, bicarb 15, 2/2 renal disease, improving w/ HD





# Uremia. Acute, BUN 150s, 2/2 renal disease, improving w/ HD





# Hypocalcemia. 2/2 renal disease and exacerbated by secondary 

hyperparathyroidism


- s/p HD and calcium





# Anemia. 2/2 ESRD, normal iron studies


- s/p 3u PRBC, Epo





# DM1. Reduced insulin





# HTN. Chronic, cont metop 100mg bid, nifedipine 30mg bid, hydral 25mg tid





# Vitamin D deficiency. Replete





Diet. Renal





PPx. Holding hep for bx, SCDs





Code. Full





Dispo. ADD 3/8, pending no biopsy complications and tunneled cath placement 

tomorrow








Subjective: Eating well after biopsy


Objective: 


 Vital Signs











Temp Pulse Resp BP Pulse Ox


 


 36.4 C   71   18   133/79 H  97 


 


 03/07/17 16:00  03/07/17 16:00  03/07/17 16:00  03/07/17 16:00  03/07/17 16:00








 Microbiology











 03/01/17 23:20 Blood Culture - Final





 Blood 


 


 03/01/17 22:48 Blood Culture - Final





 Blood 








 Laboratory Results





 03/07/17 16:20 





 03/07/17 03:39 





 











 03/06/17 03/07/17 03/08/17





 05:59 05:59 05:59


 


Intake Total 300 650 


 


Output Total  1 


 


Balance 300 649 








 











PT  14.0 SEC (12.0-15.0)   03/06/17  11:16    


 


INR  1.09  (0.83-1.16)   03/06/17  11:16    














- Pending Discharge


Pending Discharge Within 24 Hours: Yes


Pending Discharge Date: 03/08/17


Pending Discharge Time: 11:00





- Physical Exam


Constitutional: no apparent distress, appears nourished, not in pain


Cardiovascular: regular rate and rhythym, no murmur, rub, or gallop


Respiratory: no respiratory distress, no rales or rhonchi, clear to auscultation


Gastrointestinal: normoactive bowel sounds, soft, non-tender abdomen, no 

palpable masses


Skin: no rashes or abrasions, no fluctuance, no induration


Neurologic: AAOx3, sensation intact bilaterally, No facial droop


Psychiatric: interacting appropriately, not anxious, not encephalopathic, 

thought process linear





ICD10 Worksheet


Patient Problems: 


 Problems











Problem Status Onset


 


Hyperkalemia Acute  


 


Renal failure, acute Acute  


 


Uremia Acute  


 


Volume overload Acute

## 2017-03-08 LAB
ALBUMIN SERPL-MCNC: 2.4 G/DL (ref 3.5–5)
ANION GAP SERPL CALC-SCNC: 8 MEQ/L (ref 8–16)
CALCIUM SERPL-MCNC: 7.1 MG/DL (ref 8.5–10.4)
CHLORIDE SERPL-SCNC: 102 MEQ/L (ref 97–110)
CO2 SERPL-SCNC: 25 MEQ/L (ref 22–31)
CREAT SERPL-MCNC: 9.5 MG/DL (ref 0.7–1.3)
ERYTHROCYTE [DISTWIDTH] IN BLOOD BY AUTOMATED COUNT: 13.2 % (ref 11.5–15.2)
GFR SERPL CREATININE-BSD FRML MDRD: 6 ML/MIN/{1.73_M2}
GLUCOSE SERPL-MCNC: 118 MG/DL (ref 70–100)
HCT VFR BLD CALC: 25.1 % (ref 40–51)
HGB BLD-MCNC: 8.4 G/DL (ref 13.7–17.5)
LIPEMIA HEMOLYSIS FLAG: 80 (ref 0–99)
MCH RBC BLDCO QN: 29.9 PG (ref 27.9–34.1)
MCHC RBC AUTO-ENTMCNC: 33.5 G/DL (ref 32.4–36.7)
MCV RBC AUTO: 89.3 FL (ref 81.5–99.8)
PLATELET # BLD: 193 10^3/UL (ref 150–400)
PLATELET CLUMPS FLAG: 0 (ref 0–99)
POTASSIUM SERPL-SCNC: 5.6 MEQ/L (ref 3.5–5.2)
RBC # BLD AUTO: 2.81 10^6/UL (ref 4.4–6.38)
SODIUM SERPL-SCNC: 135 MEQ/L (ref 134–144)

## 2017-03-08 RX ADMIN — INSULIN LISPRO SCH UNITS: 100 INJECTION, SOLUTION INTRAVENOUS; SUBCUTANEOUS at 13:50

## 2017-03-08 RX ADMIN — CALCIUM ACETATE SCH: 667 CAPSULE ORAL at 12:46

## 2017-03-08 RX ADMIN — CALCIUM ACETATE SCH MG: 667 CAPSULE ORAL at 13:51

## 2017-03-08 RX ADMIN — SODIUM FERRIC GLUCONATE COMPLEX SCH MLS: 12.5 INJECTION INTRAVENOUS at 12:10

## 2017-03-08 RX ADMIN — METOPROLOL TARTRATE SCH MG: 100 TABLET, FILM COATED ORAL at 12:10

## 2017-03-08 RX ADMIN — METOPROLOL TARTRATE SCH MG: 100 TABLET, FILM COATED ORAL at 21:42

## 2017-03-08 RX ADMIN — CALCITRIOL SCH MCG: 0.25 CAPSULE ORAL at 09:04

## 2017-03-08 RX ADMIN — INSULIN HUMAN SCH UNITS: 100 INJECTION, SUSPENSION SUBCUTANEOUS at 09:03

## 2017-03-08 RX ADMIN — ONDANSETRON PRN MG: 4 TABLET, ORALLY DISINTEGRATING ORAL at 13:26

## 2017-03-08 RX ADMIN — ATORVASTATIN CALCIUM SCH MG: 40 TABLET, FILM COATED ORAL at 21:42

## 2017-03-08 RX ADMIN — INSULIN LISPRO SCH UNITS: 100 INJECTION, SOLUTION INTRAVENOUS; SUBCUTANEOUS at 18:38

## 2017-03-08 RX ADMIN — CALCIUM ACETATE SCH MG: 667 CAPSULE ORAL at 18:37

## 2017-03-08 RX ADMIN — INSULIN HUMAN SCH UNITS: 100 INJECTION, SUSPENSION SUBCUTANEOUS at 18:38

## 2017-03-08 RX ADMIN — INSULIN LISPRO SCH UNITS: 100 INJECTION, SOLUTION INTRAVENOUS; SUBCUTANEOUS at 09:04

## 2017-03-08 RX ADMIN — CALCIUM ACETATE SCH MG: 667 CAPSULE ORAL at 09:03

## 2017-03-08 NOTE — HOSPPROG
Hospitalist Progress Note


Assessment/Plan: 


Assessment:  51-year-old male presents with acute kidney injury on chronic 

kidney disease





Plan:





# CHELA on CKD. Suspect evolving ESRD in setting of DM1 and HTN


- Hgb stable post-bx


- s/p biopsy


- plan for tunnel cath tomorrow, outpt HD needing confirmation


- d/w Dr. Tai, patient will likely be ready tomorrow





# Metabolic acidosis. Acute, bicarb 15, 2/2 renal disease, improving w/ HD





# Uremia. Acute, BUN 150s, 2/2 renal disease, improving w/ HD





# Hypocalcemia. 2/2 renal disease and exacerbated by secondary 

hyperparathyroidism


- s/p HD and calcium





# Anemia. 2/2 ESRD, normal iron studies


- s/p 3u PRBC, Epo





# DM1. Reduced insulin





# HTN. Chronic, cont metop 100mg bid, nifedipine 30mg bid, hydral 25mg tid





# Vitamin D deficiency. Replete w/ high dose weekly x 8 weeks





Diet. Renal





PPx. Holding hep for bx, SCDs





Code. Full





Dispo. ADD 3/9, pending tunnel cath placement and confirmation of outpt HD 

center








Subjective: Patient reports he is eating well today, continues to have lower 

extremity edema with weighty legs


Objective: 


 Vital Signs











Temp Pulse Resp BP Pulse Ox


 


 37.1 C   79   20   146/87 H  95 


 


 03/08/17 11:25  03/08/17 11:25  03/08/17 11:25  03/08/17 11:25  03/08/17 11:25








 Laboratory Results





 03/08/17 04:11 





 03/08/17 04:11 





 











 03/07/17 03/08/17 03/09/17





 05:59 05:59 05:59


 


Intake Total 650 890 


 


Output Total 1  


 


Balance 649 890 








 











PT  14.0 SEC (12.0-15.0)   03/06/17  11:16    


 


INR  1.09  (0.83-1.16)   03/06/17  11:16    














- Pending Discharge


Pending Discharge Within 24 Hours: Yes


Pending Discharge Date: 03/09/17


Pending Discharge Time: 11:00





- Physical Exam


Constitutional: no apparent distress, not in pain, chronically ill appearing, 

No uncomfortable


Cardiovascular: systolic murmur (106 systolic murmur at all valve locations), 

edema (1+ bilateral lower extremity edema), No irregularly irregular, No 

tachycardia


Respiratory: inspiratory crackles (Bilateral bases), No reduced air movement, 

No expiratory wheeze, No bronchial breath sounds, No respiratory distress


Gastrointestinal: normoactive bowel sounds, soft, non-tender abdomen, no 

palpable masses


Neurologic: AAOx3, sensation intact bilaterally, No weakness (Motor 5/5 

bilateral lower extremity)


Psychiatric: interacting appropriately, not anxious, not encephalopathic, 

thought process linear





ICD10 Worksheet


Patient Problems: 


 Problems











Problem Status Onset


 


Renal failure, acute Acute  


 


Hyperkalemia Acute  


 


Volume overload Acute  


 


Uremia Acute

## 2017-03-08 NOTE — SOAPPROG
SOAP Progress Note


Assessment/Plan: 


Assessment:Plan:


Renal-likely ESRD due to DM


   -biopsy result pending


   -plan for ongoing dialysis


   -treatment today and tomorrow


   -discussed outpatient options for chronic dialysis


   -patient wishes to get treatment with me at the [facility], 

[phone], fax [phone]


   -I have contacted the unit and they are starting the admission process which 

requires confirmation of coverage, prior auth if needed, etc


   -he needs a morning treatment time to accommodate his work schedule, he 

works from 1:30 to 9:30 pm at [employer]


Volume overload-ultrafiltration with dialysis


Anemia-on EPO


   -Iron sat 23%


   -IV iron ordered


Access-has temp cath in place


   -I made him NPO for now


   -restrict LUE to allow for AVF placement


   -I have called Dr. Graves' office, they will see today


Dispo-patient interested in renal transplant


   -treatment options discussed in detail with patient, including incenter Hd, 

peritoneal dialysis and renal transplant





03/08/17 11:03





Subjective: 





stable overnite


Objective: 





 Vital Signs











Temp Pulse Resp BP Pulse Ox


 


 36.9 C   82   18   151/86 H  94 


 


 03/08/17 07:55  03/08/17 07:55  03/08/17 07:55  03/08/17 07:55  03/08/17 07:55








 Microbiology











 03/01/17 23:20 Blood Culture - Final





 Blood 


 


 03/01/17 22:48 Blood Culture - Final





 Blood 








 Laboratory Results





 03/08/17 04:11 





 03/08/17 04:11 





 











 03/07/17 03/08/17 03/09/17





 05:59 05:59 05:59


 


Intake Total 650 890 


 


Output Total 1  


 


Balance 649 890 








 











PT  14.0 SEC (12.0-15.0)   03/06/17  11:16    


 


INR  1.09  (0.83-1.16)   03/06/17  11:16    














Physical Exam





- Physical Exam


General Appearance: alert, no apparent distress


EENT: other (facial edema)


Neck: normal inspection


Respiratory: decreased breath sounds (at bases on R, 1/3 on left), rales


Cardiac/Chest: regular rate, rhythm


Abdomen: normal bowel sounds, non-tender


Extremities: swelling (into thighs)





ICD10 Worksheet


Patient Problems: 


 Problems











Problem Status Onset


 


Hyperkalemia Acute  


 


Renal failure, acute Acute  


 


Uremia Acute  


 


Volume overload Acute

## 2017-03-09 LAB
ALBUMIN SERPL-MCNC: 2.4 G/DL (ref 3.5–5)
ANION GAP SERPL CALC-SCNC: 6 MEQ/L (ref 8–16)
CALCIUM SERPL-MCNC: 7.1 MG/DL (ref 8.5–10.4)
CHLORIDE SERPL-SCNC: 101 MEQ/L (ref 97–110)
CO2 SERPL-SCNC: 27 MEQ/L (ref 22–31)
CREAT SERPL-MCNC: 7.7 MG/DL (ref 0.7–1.3)
ERYTHROCYTE [DISTWIDTH] IN BLOOD BY AUTOMATED COUNT: 13.2 % (ref 11.5–15.2)
GFR SERPL CREATININE-BSD FRML MDRD: 8 ML/MIN/{1.73_M2}
GLUCOSE SERPL-MCNC: 156 MG/DL (ref 70–100)
HCT VFR BLD CALC: 23.6 % (ref 40–51)
HGB BLD-MCNC: 7.8 G/DL (ref 13.7–17.5)
LIPEMIA HEMOLYSIS FLAG: 80 (ref 0–99)
MCH RBC BLDCO QN: 30.7 PG (ref 27.9–34.1)
MCHC RBC AUTO-ENTMCNC: 33.1 G/DL (ref 32.4–36.7)
MCV RBC AUTO: 92.9 FL (ref 81.5–99.8)
PLATELET # BLD: 176 10^3/UL (ref 150–400)
PLATELET CLUMPS FLAG: 0 (ref 0–99)
POTASSIUM SERPL-SCNC: 5 MEQ/L (ref 3.5–5.2)
RBC # BLD AUTO: 2.54 10^6/UL (ref 4.4–6.38)
SODIUM SERPL-SCNC: 134 MEQ/L (ref 134–144)

## 2017-03-09 PROCEDURE — 03170ZD BYPASS RIGHT BRACHIAL ARTERY TO UPPER ARM VEIN, OPEN APPROACH: ICD-10-PCS | Performed by: SURGERY

## 2017-03-09 PROCEDURE — 05HM33Z INSERTION OF INFUSION DEVICE INTO RIGHT INTERNAL JUGULAR VEIN, PERCUTANEOUS APPROACH: ICD-10-PCS | Performed by: SURGERY

## 2017-03-09 RX ADMIN — ONDANSETRON PRN MG: 4 TABLET, ORALLY DISINTEGRATING ORAL at 15:23

## 2017-03-09 RX ADMIN — INSULIN HUMAN SCH UNITS: 100 INJECTION, SUSPENSION SUBCUTANEOUS at 18:29

## 2017-03-09 RX ADMIN — ATORVASTATIN CALCIUM SCH MG: 40 TABLET, FILM COATED ORAL at 21:32

## 2017-03-09 RX ADMIN — SODIUM FERRIC GLUCONATE COMPLEX SCH MLS: 12.5 INJECTION INTRAVENOUS at 08:46

## 2017-03-09 RX ADMIN — CALCIUM ACETATE SCH MG: 667 CAPSULE ORAL at 19:13

## 2017-03-09 RX ADMIN — INSULIN LISPRO SCH: 100 INJECTION, SOLUTION INTRAVENOUS; SUBCUTANEOUS at 18:28

## 2017-03-09 RX ADMIN — INSULIN LISPRO SCH: 100 INJECTION, SOLUTION INTRAVENOUS; SUBCUTANEOUS at 08:41

## 2017-03-09 RX ADMIN — METOPROLOL TARTRATE SCH MG: 100 TABLET, FILM COATED ORAL at 08:48

## 2017-03-09 RX ADMIN — CALCIUM ACETATE SCH: 667 CAPSULE ORAL at 12:00

## 2017-03-09 RX ADMIN — HYDROCODONE BITARTRATE AND ACETAMINOPHEN PRN TAB: 5; 325 TABLET ORAL at 14:05

## 2017-03-09 RX ADMIN — CALCIUM ACETATE SCH: 667 CAPSULE ORAL at 08:38

## 2017-03-09 RX ADMIN — INSULIN HUMAN SCH UNITS: 100 INJECTION, SUSPENSION SUBCUTANEOUS at 08:40

## 2017-03-09 RX ADMIN — METOPROLOL TARTRATE SCH MG: 100 TABLET, FILM COATED ORAL at 21:32

## 2017-03-09 RX ADMIN — INSULIN LISPRO SCH: 100 INJECTION, SOLUTION INTRAVENOUS; SUBCUTANEOUS at 12:00

## 2017-03-09 RX ADMIN — HYDROCODONE BITARTRATE AND ACETAMINOPHEN PRN TAB: 5; 325 TABLET ORAL at 21:30

## 2017-03-09 NOTE — SOAPPROG
SOAP Progress Note


Assessment/Plan: 


Assessment:Plan:


s/p AVF and tunneled dialysis catheter placement


Stable at onset of dialysis





03/09/17 15:03





Objective: 





 Vital Signs











Temp Pulse Resp BP Pulse Ox


 


 36.6 C   87   16   125/70 H  98 


 


 03/09/17 13:43  03/09/17 14:06  03/09/17 14:06  03/09/17 14:06  03/09/17 14:06








 Laboratory Results





 03/09/17 06:20 





 03/09/17 06:20 





 











 03/08/17 03/09/17 03/10/17





 05:59 05:59 05:59


 


Intake Total 890 300 600


 


Output Total   35


 


Balance 890 300 565








 











PT  14.0 SEC (12.0-15.0)   03/06/17  11:16    


 


INR  1.09  (0.83-1.16)   03/06/17  11:16    














ICD10 Worksheet


Patient Problems: 


 Problems











Problem Status Onset


 


Hyperkalemia Acute  


 


Renal failure, acute Acute  


 


Uremia Acute  


 


Volume overload Acute

## 2017-03-09 NOTE — SOAPPROG
SOAP Progress Note


Assessment/Plan: 


Assessment:Plan:


Renal-likely ESRD due to DM


   -biopsy result demonstrates severe diabetic nephropathy, no immune deposits 

to suggest other etiology


   -plan for ongoing dialysis


   -treatment today and tomorrow


   -discussed outpatient options for chronic dialysis yesterday


   -patient wishes to get treatment with me at the [facility], 

[phone], fax [phone]


   -I have contacted the unit and they are starting the admission process which 

requires confirmation of coverage, prior auth if needed, etc


   -he needs a morning treatment time to accommodate his work schedule, he 

works from 1:30 to 9:30 pm at [employer]


   -he is approved to start treatment there on Monday at 7:30


   -he plans to go over prior to his first treatment for paperwork and 

orientation


   -he will likely be discharged tomorrow after dialysis


   -we will try to make him a first case


Volume overload-ultrafiltration with dialysis


Anemia-on EPO


   -Iron sat 23%


   -IV iron ordered


Access-has temp cath in place


   -in pre-op for catheter revision and possible AVF


   -restrict LUE to allow for AVF placement


Dispo-patient interested in renal transplant


   -treatment options discussed in detail with patient, including incenter Hd, 

peritoneal dialysis and renal transplant


   -plan for discharge tomorrow after dialysis if he is stable





03/09/17 09:49





Subjective: 





awaiting operation, stable


Objective: 





 Vital Signs











Temp Pulse Resp BP Pulse Ox


 


 37.1 C   80   16   130/69 H  95 


 


 03/09/17 08:00  03/09/17 08:00  03/09/17 08:00  03/09/17 08:00  03/09/17 08:00








 Laboratory Results





 03/09/17 06:20 





 03/09/17 06:20 





 











 03/08/17 03/09/17 03/10/17





 05:59 05:59 05:59


 


Intake Total 890 300 


 


Balance 890 300 








 











PT  14.0 SEC (12.0-15.0)   03/06/17  11:16    


 


INR  1.09  (0.83-1.16)   03/06/17  11:16    














Physical Exam





- Physical Exam


General Appearance: alert, no apparent distress


EENT: normal ENT inspection


Neck: normal inspection


Respiratory: decreased breath sounds


Cardiac/Chest: regular rate, rhythm, systolic murmur


Abdomen: normal bowel sounds, non-tender, soft


Extremities: swelling





ICD10 Worksheet


Patient Problems: 


 Problems











Problem Status Onset


 


Hyperkalemia Acute  


 


Renal failure, acute Acute  


 


Uremia Acute  


 


Volume overload Acute

## 2017-03-09 NOTE — POSTOPPROG
Post Op Note


Date of Operation: 03/09/17


Surgeon: Sai Graves


Assistant: Kobe Noonan


Anesthesiologist: Dr Day


Anesthesia: GET(General Endotracheal)


Pre-op Diagnosis: renal failure, need for IV access


Post-op Diagnosis: same


Indication: need for access


Procedure: LUE AVF, Tunneled catheter placement


Inf/Abcess present in the surg proc area at time of surgery?: No


EBL: Minimal

## 2017-03-09 NOTE — SOAPPROG
SOAP Progress Note


Assessment/Plan: 


Assessment:


0K POSTOP


GOOD THRILL IN AVF/ CXR OK























Plan:PER RENAL





03/09/17 23:18





Objective: 





 Vital Signs











Temp Pulse Resp BP Pulse Ox


 


 36.8 C   84   16   170/84 H  96 


 


 03/09/17 20:17  03/09/17 20:17  03/09/17 20:17  03/09/17 20:17  03/09/17 20:17








 Laboratory Results





 03/09/17 06:20 





 03/09/17 06:20 





 











 03/08/17 03/09/17 03/10/17





 05:59 05:59 05:59


 


Intake Total 


 


Output Total   35


 


Balance 








 











PT  14.0 SEC (12.0-15.0)   03/06/17  11:16    


 


INR  1.09  (0.83-1.16)   03/06/17  11:16    














ICD10 Worksheet


Patient Problems: 


 Problems











Problem Status Onset


 


Hyperkalemia Acute  


 


Renal failure, acute Acute  


 


Uremia Acute  


 


Volume overload Acute

## 2017-03-09 NOTE — HOSPPROG
Hospitalist Progress Note


Assessment/Plan: 


INTERVAL SUMMARY & DAILY PROGRESS NOTE





DATE OF ADMISSION:  3/1/2017





INTERVAL DIAGNOSES


1. Acute kidney injury on chronic kidney disease


2. Acute metabolic acidosis


3. Acute uremia


4. Acute hypocalcemia


5. Anemia of end-stage renal disease


6. Diabetes mellitus type 1


7. Chronic hypertension


8. Severe vitamin-D deficiency





CONSULTATIONS


Nephrology





PROCEDURES / IMAGING


3/2/2017 central venous catheter for dialysis access placed, 3/9/2017 tunneled 

cath placement and AV fistula left arm





CHIEF COMPLAINT


Weakness, shortness of breath





SUBJECTIVE


Patient reports that he is less short of breath, continues to experience pain 

in his right chest and left upper extremity





HOSPITAL COURSE BY PROBLEM


The patient presented with weakness and shortness of breath most likely 

secondary to acute worsening of his chronic kidney disease resulting in 

metabolic acidosis, uremia, hypervolemia.  He received hemodialysis and his 

symptoms began to improve.  He received a kidney biopsy to determine the exact 

etiology of his kidney disease, biopsy results are currently pending.  He 

received aggressive calcium replacement as well as blood product and 

erythropoietin as well as iron.  Outpatient hemodialysis was arranged, and a 

tunnel cath as well as AV fistula were placed.





Assessment:  51-year-old male presents with acute kidney injury on chronic 

kidney disease





Plan:





# CHELA on CKD. Suspect evolving ESRD in setting of DM1 and HTN


- Hgb stable post-bx


- tunnel cath and AV fistula today, HD tomorrow, then discharge


- d/w Dr. Tai, advises that patient has MWF HD arrange as outpt beginning 3/

13





# Metabolic acidosis. Acute, bicarb 15, 2/2 renal disease, improving w/ HD





# Uremia. Acute, BUN 150s, 2/2 renal disease, improving w/ HD





# Hypocalcemia. 2/2 renal disease and exacerbated by secondary 

hyperparathyroidism


- s/p HD and calcium





# Anemia. 2/2 ESRD, normal iron studies


- s/p 4u PRBC, Epo, IV iron





# DM1. Reduced insulin





# HTN. Chronic, cont metop 100mg bid, nifedipine 30mg bid, hydral 25mg tid





# Vitamin D deficiency. Replete w/ high dose weekly x 8 weeks





Diet. Renal





PPx. SCDs given surg





Code. Full





Dispo. ADD 3/10, requiring HD tomorrow since he cannot get outpt HD until 3/13








Subjective: Patient reports that he has some pain in his right chest and left 

upper extremity status post surgery


Objective: 


 Vital Signs











Temp Pulse Resp BP Pulse Ox


 


 36.6 C   87   16   125/70 H  98 


 


 03/09/17 13:43  03/09/17 14:06  03/09/17 14:06  03/09/17 14:06  03/09/17 14:06








 Laboratory Results





 03/09/17 06:20 





 03/09/17 06:20 





 











 03/08/17 03/09/17 03/10/17





 05:59 05:59 05:59


 


Intake Total 890 300 600


 


Output Total   35


 


Balance 890 300 565








 











PT  14.0 SEC (12.0-15.0)   03/06/17  11:16    


 


INR  1.09  (0.83-1.16)   03/06/17  11:16    














- Pending Discharge


Pending Discharge Within 24 Hours: Yes


Pending Discharge Date: 03/10/17


Pending Discharge Time: 11:00





- Physical Exam


Constitutional: no apparent distress, obese, uncomfortable, No not in pain


Cardiovascular: edema (Trace bilateral lower extremity), No systolic murmur, No 

irregularly irregular, No tachycardia


Respiratory: inspiratory crackles (Bilateral bases), No reduced air movement, 

No expiratory wheeze, No bronchial breath sounds, No respiratory distress


Gastrointestinal: normoactive bowel sounds, soft, non-tender abdomen, no 

palpable masses


Skin: other (Bandaged left upper extremity, tunnel catheter in right chest, no 

surrounding erythema, no induration, no fluctuance, mild tenderness)


Neurologic: AAOx3, sensation intact bilaterally


Psychiatric: interacting appropriately, not encephalopathic, thought process 

linear, flat affect





ICD10 Worksheet


Patient Problems: 


 Problems











Problem Status Onset


 


Renal failure, acute Acute  


 


Hyperkalemia Acute  


 


Volume overload Acute  


 


Uremia Acute

## 2017-03-10 VITALS — RESPIRATION RATE: 18 BRPM

## 2017-03-10 VITALS
HEART RATE: 81 BPM | SYSTOLIC BLOOD PRESSURE: 140 MMHG | DIASTOLIC BLOOD PRESSURE: 78 MMHG | OXYGEN SATURATION: 98 % | TEMPERATURE: 98.2 F

## 2017-03-10 LAB
% IMMATURE GRANULYOCYTES: 0.5 % (ref 0–1.1)
ABSOLUTE IMMATURE GRANULOCYTES: 0.06 10^3/UL (ref 0–0.1)
ABSOLUTE NRBC COUNT: 0 10^3/UL (ref 0–0.01)
ADD DIFF?: NO
ADD MORPH?: NO
ADD SCAN?: NO
ALBUMIN SERPL-MCNC: 2.6 G/DL (ref 3.5–5)
ANION GAP SERPL CALC-SCNC: 8 MEQ/L (ref 8–16)
ATYPICAL LYMPHOCYTE FLAG: 30 (ref 0–99)
CALCIUM SERPL-MCNC: 7.7 MG/DL (ref 8.5–10.4)
CHLORIDE SERPL-SCNC: 100 MEQ/L (ref 97–110)
CO2 SERPL-SCNC: 26 MEQ/L (ref 22–31)
CREAT SERPL-MCNC: 6.4 MG/DL (ref 0.7–1.3)
ERYTHROCYTE [DISTWIDTH] IN BLOOD BY AUTOMATED COUNT: 14.5 % (ref 11.5–15.2)
FRAGMENT RBC FLAG: 0 (ref 0–99)
GFR SERPL CREATININE-BSD FRML MDRD: 9 ML/MIN/{1.73_M2}
GLUCOSE SERPL-MCNC: 113 MG/DL (ref 70–100)
HCT VFR BLD CALC: 27.4 % (ref 40–51)
HGB BLD-MCNC: 9.1 G/DL (ref 13.7–17.5)
LEFT SHIFT FLG: 0 (ref 0–99)
LIPEMIA HEMOLYSIS FLAG: 80 (ref 0–99)
MCH RBC BLDCO QN: 30.4 PG (ref 27.9–34.1)
MCHC RBC AUTO-ENTMCNC: 33.2 G/DL (ref 32.4–36.7)
MCV RBC AUTO: 91.6 FL (ref 81.5–99.8)
NRBC-AUTO%: 0 % (ref 0–0.2)
PLATELET # BLD: 174 10^3/UL (ref 150–400)
PLATELET CLUMPS FLAG: 0 (ref 0–99)
PMV BLD AUTO: 10.1 FL (ref 8.7–11.7)
POTASSIUM SERPL-SCNC: 4.7 MEQ/L (ref 3.5–5.2)
RBC # BLD AUTO: 2.99 10^6/UL (ref 4.4–6.38)
SODIUM SERPL-SCNC: 134 MEQ/L (ref 134–144)

## 2017-03-10 RX ADMIN — INSULIN LISPRO SCH: 100 INJECTION, SOLUTION INTRAVENOUS; SUBCUTANEOUS at 09:35

## 2017-03-10 RX ADMIN — METOPROLOL TARTRATE SCH MG: 100 TABLET, FILM COATED ORAL at 11:32

## 2017-03-10 RX ADMIN — INSULIN HUMAN SCH UNITS: 100 INJECTION, SUSPENSION SUBCUTANEOUS at 07:41

## 2017-03-10 RX ADMIN — HYDROCODONE BITARTRATE AND ACETAMINOPHEN PRN TAB: 5; 325 TABLET ORAL at 06:09

## 2017-03-10 RX ADMIN — CALCIUM ACETATE SCH MG: 667 CAPSULE ORAL at 11:32

## 2017-03-10 RX ADMIN — CALCIUM ACETATE SCH: 667 CAPSULE ORAL at 12:44

## 2017-03-10 RX ADMIN — ERYTHROPOIETIN SCH UNIT: 10000 INJECTION, SOLUTION INTRAVENOUS; SUBCUTANEOUS at 14:30

## 2017-03-10 RX ADMIN — INSULIN LISPRO SCH UNITS: 100 INJECTION, SOLUTION INTRAVENOUS; SUBCUTANEOUS at 12:42

## 2017-03-10 RX ADMIN — SODIUM FERRIC GLUCONATE COMPLEX SCH MLS: 12.5 INJECTION INTRAVENOUS at 11:33

## 2017-03-10 RX ADMIN — CALCITRIOL SCH MCG: 0.25 CAPSULE ORAL at 11:32

## 2017-03-10 RX ADMIN — HYDROCODONE BITARTRATE AND ACETAMINOPHEN PRN TAB: 5; 325 TABLET ORAL at 11:40

## 2017-03-10 NOTE — HOSPPROG
Hospitalist Progress Note


Assessment/Plan: 


50 yo M w likely esrd





CHELA on CKD. Suspect evolving ESRD in setting of DM1 and HTN


- Hgb stable post-bx


- tunnel cath and AV fistula today, HD tomorrow, then discharge


- d/w Dr. Tai, advises that patient has MWF HD arrange as outpt beginning 3/

13





Metabolic acidosis. Acute, bicarb 15, 2/2 renal disease, improving w/ HD





# Uremia. Acute, BUN 150s, 2/2 renal disease, improving w/ HD





# Hypocalcemia. 2/2 renal disease and exacerbated by secondary 

hyperparathyroidism


- s/p HD and calcium





# Anemia. 2/2 ESRD, normal iron studies


- s/p 4u PRBC, Epo, IV iron





# DM1. Reduced insulin





# HTN. Chronic, cont metop 100mg bid, nifedipine 30mg bid, hydral 25mg tid





# Vitamin D deficiency. Replete w/ high dose weekly x 8 weeks





home today


   > 30 minutes


Subjective: seen in HD.  outpt HD arranged


Objective: 


 Vital Signs











Temp Pulse Resp BP Pulse Ox


 


 36.8 C   81   18   140/78 H  98 


 


 03/10/17 07:06  03/10/17 07:06  03/10/17 07:06  03/10/17 07:06  03/10/17 07:06








 Laboratory Results





 03/10/17 06:10 





 03/10/17 06:10 





 











 03/09/17 03/10/17 03/11/17





 05:59 05:59 05:59


 


Intake Total 300 1365 


 


Output Total  35 


 


Balance 300 1330 








 











PT  14.0 SEC (12.0-15.0)   03/06/17  11:16    


 


INR  1.09  (0.83-1.16)   03/06/17  11:16    














- Physical Exam


Constitutional: no apparent distress, appears nourished


Eyes: PERRL, anicteric sclera


Ears, Nose, Mouth, Throat: moist mucous membranes, hearing normal


Cardiovascular: regular rate and rhythym, no murmur, rub, or gallop


Respiratory: no respiratory distress, no rales or rhonchi


Gastrointestinal: normoactive bowel sounds, soft, non-tender abdomen


Genitourinary: No romero in urethra


Skin: warm, normal color


Musculoskeletal: full muscle strength


Neurologic: AAOx3





ICD10 Worksheet


Patient Problems: 


 Problems











Problem Status Onset


 


Hyperkalemia Acute  


 


Renal failure, acute Acute  


 


Uremia Acute  


 


Volume overload Acute

## 2017-03-10 NOTE — SOAPPROG
SOAP Progress Note


Assessment/Plan: 


Assessment:





52yo male s/p LUE AVF and tunneled catheter placement, POD 1





No complaints, no significant pain 





PE


awake, sitting up on edge of bed


no erythema around tunneled cath


LUE thrill present, normal radius pulse to palpation





Plan:


ok to d/c from general surgery standpoint


put F/U instructions in D/C plan





03/10/17 13:07





Objective: 





 Vital Signs











Temp Pulse Resp BP Pulse Ox


 


 36.8 C   81   18   140/78 H  98 


 


 03/10/17 07:06  03/10/17 07:06  03/10/17 07:06  03/10/17 07:06  03/10/17 07:06








 Laboratory Results





 03/10/17 06:10 





 03/10/17 06:10 





 











 03/09/17 03/10/17 03/11/17





 05:59 05:59 05:59


 


Intake Total 300 1365 


 


Output Total  35 


 


Balance 300 1330 








 











PT  14.0 SEC (12.0-15.0)   03/06/17  11:16    


 


INR  1.09  (0.83-1.16)   03/06/17  11:16    














ICD10 Worksheet


Patient Problems: 


 Problems











Problem Status Onset


 


Hyperkalemia Acute  


 


Renal failure, acute Acute  


 


Uremia Acute  


 


Volume overload Acute

## 2017-03-10 NOTE — GDS
[f rep st]



                                                             DISCHARGE SUMMARY





DISCHARGE DIAGNOSES:  

1.  End-stage renal disease, initiated on dialysis.

2.  Metabolic acidosis, resolved.

3.  Uremia, resolved.

4.  Hypercalcemia, resolved.

5.  Anemia secondary to end-stage renal disease.

6.  Type 1 diabetes.



PROCEDURES DURING ADMISSION:  

1.  AV fistula placed on 03/09/2017.  

2.  Tunneled catheter placed on 03/09/2017.  

3.  Portable central venous catheter for dialysis, 03/02/2017.



HOSPITAL COURSE:  The patient presented with shortness of breath in the setting of metabolic acidosi
s, uremia and hyperkalemia.  He was initiated on dialysis.  He had a kidney biopsy performed on the 
7th, the results of which are pending at this time.  He is followed by Nephrology.  Outpatient dialy
sis this tablet in [place].  He is discharged today.





Job #:  [id number]

## 2017-03-10 NOTE — SOAPPROG
SOAP Progress Note


Assessment/Plan: 


Assessment/Plan:





ESRD: Biopsy shows severe diabetic nephropathy, pt is now dialysis dependent.


 - HD being done today.


 - Pt to continue HD on MWF schedule, is set up for dialysis at [facility] 

on MWF at 6:30am.





Anemia: pt will continue epo and iron per outpatient dialysis unit protocol.





HTN: improved on current meds.





DOMINGUEZ: Hypocalcemia improved, phos still elevated but improving.


 - Will continue calcitriol.


 - Will continue calcium acetate with meals.











Subjective: 





No acute events overnight.  Pt feeling well overall, hoping to go home soon.  

He has no issues on HD today.


Objective: 





 Vital Signs











Temp Pulse Resp BP Pulse Ox


 


 36.8 C   81   18   140/78 H  98 


 


 03/10/17 07:06  03/10/17 07:06  03/10/17 07:06  03/10/17 07:06  03/10/17 07:06








 Laboratory Results





 03/10/17 06:10 





 03/10/17 06:10 





 











 03/09/17 03/10/17 03/11/17





 05:59 05:59 05:59


 


Intake Total 300 1365 


 


Output Total  35 


 


Balance 300 1330 








 











PT  14.0 SEC (12.0-15.0)   03/06/17  11:16    


 


INR  1.09  (0.83-1.16)   03/06/17  11:16    








General: alert and oriented, no acute distress


Eyes; EOMI, PERRL


OP: Clear


CV: RRR


Resp: nonlabored respirations on NC


Abd; soft, NT/ND


Ext: trace edema BLE


Neuro: CN II-XII grossly intact, no asterixis


Psych: cooperative, appropriate mood and affect


Access; R IJ tunneled catheter





ICD10 Worksheet


Patient Problems: 


 Problems











Problem Status Onset


 


Hyperkalemia Acute  


 


Renal failure, acute Acute  


 


Uremia Acute  


 


Volume overload Acute

## 2017-03-12 NOTE — GOP
[f rep st]



                                                                OPERATIVE REPORT





DATE OF OPERATION:  03/09/2017



SURGEON:  Sai Graves MD



ASSISTANT:  Kobe Noonan PA-C



ANESTHESIOLOGIST:  Phoenix Hurtado DO.



PREOPERATIVE DIAGNOSIS:  Chronic renal failure.



POSTOPERATIVE DIAGNOSIS:  Chronic renal failure.



PROCEDURE PERFORMED:  

1.  Left arm ultrasound vein mapping.  

2.  Left brachiocephalic arteriovenous fistula.



FINDINGS:  The patient was found to have an excellent cephalic vein in the upper arm, as the cephali
c vein at the wrist was small.  Basilic vein was also quite adequate.



ESTIMATED BLOOD LOSS:  Less than 25 cc.



DESCRIPTION OF PROCEDURE:  The patient was taken to the operating room where he received satisfactor
y general endotracheal anesthesia by Dr. Hurtado.  He was placed in the supine position with his
 left arm outstretched on an arm board, prepped and draped in usual sterile fashion.  Ultrasound was
 used to map the veins in his arm with the above-noted findings.  It was elected because of his diab
etes to proceed with an upper arm brachiocephalic AV fistula.  A curvilinear incision was made in th
e antecubital space.  Dissection was carried down through the biceps aponeurosis and the brachial ar
chin was dissected free and controlled with vessel loops.  The cephalic vein was then dissected free
 above and below the antecubital space.  It was dissected down into the forearm to the point where i
t bifurcates.  At that point, it was ligated and divided and rotated over to the brachial artery, wh
ere an end-to-side anastomosis was made between the cephalic vein and the brachial artery.  This was
 done with a running 6-0 Prolene suture.  Flow was first established through the AV fistula and then
 back down the hand, preserving a good pulse distally with good capillary filling, and an excellent 
thrill in the fistula.  Heparin was reversed with protamine.  The wound was instilled with some topi
tara thrombin and infiltrated with 0.5% Marcaine and closed in layers using 3-0 Vicryl for the subcut
aneous tissue and 4-0 Monocryl subcuticular stitch for the skin.  All layers were infiltrated with 0
.5% Marcaine.  Blood loss was negligible.  No complications.  Taken to the recovery room in good con
dition.



COMPLICATIONS:  None.





Job #:  [id number]

## 2017-03-12 NOTE — GCON
[f rep st]



                                                                    CONSULTATION





DATE OF CONSULTATION:  03/08/2017



The patient is a 51-year-old diabetic male with chronic renal failure.  I was request to see the pat
mason for placement of a tunneled Palindrome catheter and an AV fistula.  He is an insulin dependent 
type 1 diabetic.  He also has hypertension and hyperlipidemia.



ALLERGIES:  None.



MEDICATIONS:  Lantus insulin, Lasix, Lipitor, metoprolol, and Tradjenta.



PAST MEDICAL HISTORY:  Includes hypertension, hyperlipidemia, diabetes, chronic renal failure.



FAMILY HISTORY:  Positive for diabetes.



SOCIAL HISTORY:  Reveals he does not smoke.



REVIEW OF SYSTEMS:  Reveals no major other medical problems other than related to present illness.



PHYSICAL EXAM:  GENERAL:  Reveals an alert 51-year-old male in no acute distress.  HEAD and NECK:  B
enign without icterus or adenopathy.  He has a temporary dialysis catheter in his right neck.  CHEST
:  Clear and symmetric.  CARDIAC:  Regular rhythm.  ABDOMEN:  Soft without masses or tenderness.  EX
TREMITIES:  Benign with full pulses.  Specifically, his left arm has good radial and brachial pulses
.  He has poor veins in his forearm but adequate pains in his upper arm on physical exam.



IMPRESSION:  Chronic renal failure secondary to diabetes.



PLAN:  

1.  A tunneled dialysis catheter.

2.  Left arm AV fistula.





Job #:  [id number]

## 2017-03-12 NOTE — GOP
[f 
rep st]



                                                                OPERATIVE REPORT





DATE OF OPERATION:  03/09/2017



SURGEON:  Sai Graves MD



ANESTHESIOLOGIST:  Phoenix Hurtado DO



PREOPERATIVE DIAGNOSIS:  Chronic renal failure.



POSTOPERATIVE DIAGNOSIS:  Chronic renal failure.



PROCEDURE PERFORMED:  Right internal jugular palindromic tunneled catheter 
placement.



FINDINGS:  good position and flow



DESCRIPTION OF PROCEDURE:  The patient was in the operating room under general 
endotracheal anesthesia by Dr. Hurtado.  He was placed in the Trendelenburg 
position.  A guidewire was introduced into the right jugular vein.  Position 
was confirmed with fluoroscopy.  A tunneled palindromic catheter was brought in 
through a separate stab incision in the anterior chest wall and tunneled up 
over the clavicle to the guidewire insertion site which had been excised and 
debrided from the previous catheter in that area.  Dilators were passed over 
the guidewire and then the palindromic catheter was introduced through the 
dilator sheath and introducer system, which was removed.  Good backflow was 
present in the catheter.  Good position was confirmed with fluoroscopy.  The 
catheter was instilled with an appropriate amount of 5000 units/cc heparin.  
Secured at the exit site with interrupted 4-0 Prolene sutures and the entrance 
site was closed with 4-0 Prolene mattress sutures.  He tolerated the procedure 
well.  There were no complications.  He was taken to the recovery room in good 
condition.





Job #:  [id number]

MTDD

## 2017-08-17 ENCOUNTER — HOSPITAL ENCOUNTER (OUTPATIENT)
Dept: HOSPITAL 80 - FIMAGING | Age: 52
Discharge: HOME | End: 2017-08-17
Attending: RADIOLOGY
Payer: COMMERCIAL

## 2017-08-17 VITALS — TEMPERATURE: 97.3 F

## 2017-08-17 VITALS — OXYGEN SATURATION: 100 %

## 2017-08-17 DIAGNOSIS — N18.6: Primary | ICD-10-CM

## 2017-08-17 LAB
APTT BLD: 29.4 SEC (ref 23–38)
HCT VFR BLD CALC: 27.9 % (ref 40–51)
HGB BLD-MCNC: 9.2 G/DL (ref 13.7–17.5)
INR PPP: 1.05 (ref 0.83–1.16)
PLATELET # BLD: 176 10^3/UL (ref 150–400)
PROTHROMBIN TIME: 13.6 SEC (ref 12–15)

## 2017-08-17 PROCEDURE — 0WPG33Z REMOVAL OF INFUSION DEVICE FROM PERITONEAL CAVITY, PERCUTANEOUS APPROACH: ICD-10-PCS | Performed by: RADIOLOGY

## 2017-08-17 PROCEDURE — C1769 GUIDE WIRE: HCPCS

## 2017-08-17 PROCEDURE — 49422 REMOVE TUNNELED IP CATH: CPT

## 2017-08-17 PROCEDURE — 99152 MOD SED SAME PHYS/QHP 5/>YRS: CPT

## 2017-08-20 ENCOUNTER — HOSPITAL ENCOUNTER (EMERGENCY)
Dept: HOSPITAL 80 - FED | Age: 52
Discharge: HOME | End: 2017-08-20
Payer: COMMERCIAL

## 2017-08-20 VITALS
HEART RATE: 102 BPM | TEMPERATURE: 98.1 F | RESPIRATION RATE: 14 BRPM | SYSTOLIC BLOOD PRESSURE: 183 MMHG | DIASTOLIC BLOOD PRESSURE: 88 MMHG | OXYGEN SATURATION: 97 %

## 2017-08-20 DIAGNOSIS — N18.9: ICD-10-CM

## 2017-08-20 DIAGNOSIS — H43.12: Primary | ICD-10-CM

## 2017-08-20 DIAGNOSIS — I12.9: ICD-10-CM

## 2017-08-20 DIAGNOSIS — E10.9: ICD-10-CM

## 2017-08-20 NOTE — EDPHY
HPI/HX/ROS/PE/MDM


Narrative: 


CHIEF COMPLAINT: Vision Changes





HPI: The patient is a 53 y/o male arriving with his wife complaining of vision 

changes onset Friday, 2 days ago. He has a history of insulin dependant 

diabetes and was admitted 3/01/17 for chronic renal failure. His vision changes 

began 2 days ago. He describes a large dark spot in the center and lateral left 

field of vision that takes up half of his vision. In his right eye he describes 

two smaller dark spots in his right field of vision. He only noticed his vision 

changes after his wife mentioned his eyes looked red. He denies associated eye 

pain or preceding trauma. He has a history of panretinal photocoagulation for 

pre-proliferative diabetic retinopathy in 2013. 





He notes his dialysis port was removed last week. He mentions he had a cleaning 

solution sprayed in his eyes while cleaning pots yesterday.


The patient is a poor historian. 





REVIEW OF SYSTEMS:


Aside from elements discussed in the HPI, a comprehensive 10-point review of 

systems was reviewed and is negative.





PMH: Type 1 Diabetes - Insulin, Dialysis, Hypertension, Hyperlipidemia, 

Bilateral Panretinal Photocoagulation by Dr. Cosby in 2013. 





SOCIAL HISTORY: Wife at bedside. Employed. No tobacco. 





Prior medical records reviewed, including admission 3/01/17 for chronic renal 

failure. 





PHYSICAL EXAM:


General:Patient is alert, in no acute distress, sitting in a dark room. 


HEENT: Left retina is not visible on funduscopic exam, right retina normal. 

Intraocular pressure is 15 bilaterally. Mild bilateral conjunctival injection. 

ENT inspection normal. 


Neck: Normal inspection.  Full range of motion.


Respiratory:No respiratory distress. 


Skin: Normal color.  No rash.  Warm and dry.


Extremities: Normal appearance.  Full range of motion. Left arm AV fistula.


Neuro: Oriented x3.  Normal motor function.  Normal sensory function.

















ED Course: 





743: Consulted with Dr. Vick, Ophthalmology. Based on description he believes 

this is likely a vitreous hemorrhage and recommends outpatient follow up 

tomorrow morning with his office. He also recommends for the patient to sleep 

in an upright position.  





MDM: 





This patient presents with painless vision loss, primarily in left eye, in the 

setting of ESRD and history of diabetic retinopathy.  I described physical exam 

findings to on call Ophtho and they will see in office tomorrow morning.  The 

patient has no signs of glaucoma, corneal abrasion, hyphema, CVA or ocular 

trauma.  He is comfortable with this plan. 





General


Initial Vital Signs: 


 Initial Vital Signs











Temperature (C)  36.7 C   08/20/17 06:50


 


Heart Rate  102 H  08/20/17 06:50


 


Respiratory Rate  14   08/20/17 06:50


 


Blood Pressure  183/88 H  08/20/17 06:50


 


O2 Sat (%)  97   08/20/17 06:50








 











O2 Delivery Mode               Room Air














Allergies/Adverse Reactions: 


 





No Known Allergies Allergy (Verified 03/01/17 17:38)


 








Home Medications: 














 Medication  Instructions  Recorded


 


Atorvastatin Calcium [Lipitor 40 40 mg PO HS 03/02/17





mg (*)]  


 


Calcium Carbonate [Tums 500MG (*)] 1,000 mg PO TIDMEAL 03/02/17


 


Furosemide [Lasix 80 MG (*)] 80 mg PO BIDDIUR 03/02/17


 


Insulin Aspart Novolog 70/30 55 units SC BIDAC 03/02/17





[Novolog Mix 70/30 (*)]  


 


Linagliptin [Tradjenta] 5 mg PO DAILY 03/02/17


 


Metoprolol Tartrate [Lopressor 100 100 mg PO BID 03/02/17





mg (*)]  


 


Calcitriol [Calcitriol (*)] 0.25 mcg PO MWF #15 cap 03/10/17


 


Calcium Acetate [Phoslo (*)] 1,334 mg PO TIDMEAL #90 cap 03/10/17


 


Hydrocodone/APAP 5/325 [Norco 2 tab PO Q4HRS PRN #12 tab 03/10/17





5/325 (*)]  


 


NIFEdipine ER [Adalat CC 30 mg (*)] 30 mg PO BID #60 tab 03/10/17


 


hydrALAZINE [Apresoline] 25 mg PO TID #90 tab 03/10/17














Departure





- Departure


Disposition: Home, Routine, Self-Care


Clinical Impression: 


 Vitreous hemorrhage of left eye





Condition: Good


Instructions:  Visual Floaters (ED)


Additional Instructions: 


1. Sleep in an upright position. Do not lie flat until cleared by eye doctor.


2. Call Dr. Vick's office first thing tomorrow morning to schedule a same day 

appointment without fail. You must be seen by an eye doctor tomorrow to 

evaluate your vision changes. 


3. If you experience any eye pain, severe headache, or further vision loss 

return to the emergency department immediately. 


Referrals: 


Caty Montez MD [Primary Care Provider] - As per Instructions


Sean Vick MD [Medical Doctor] - As per Instructions


Report Scribed for: Chuy Barcenas


Report Scribed by: Jackeline Hayden


Date of Report: 08/20/17


Time of Report: 07:03


Physician Review and Approval Statement: Portions of this note were transcribed 

by an ED scribe.  I personally performed the history, physical exam, and 

medical decision making; and confirm the accuracy of the information in the 

transcribed note.

## 2018-07-26 ENCOUNTER — HOSPITAL ENCOUNTER (EMERGENCY)
Dept: HOSPITAL 80 - FED | Age: 53
Discharge: HOME | End: 2018-07-26
Payer: COMMERCIAL

## 2018-07-26 VITALS — DIASTOLIC BLOOD PRESSURE: 78 MMHG | SYSTOLIC BLOOD PRESSURE: 142 MMHG

## 2018-07-26 DIAGNOSIS — Z79.4: ICD-10-CM

## 2018-07-26 DIAGNOSIS — I12.0: ICD-10-CM

## 2018-07-26 DIAGNOSIS — N18.6: ICD-10-CM

## 2018-07-26 DIAGNOSIS — Z99.2: ICD-10-CM

## 2018-07-26 DIAGNOSIS — E11.9: ICD-10-CM

## 2018-07-26 DIAGNOSIS — K59.01: Primary | ICD-10-CM

## 2018-07-26 NOTE — EDPHY
H & P


Stated Complaint: Constipated x2 days - Rectal pain


Time Seen by Provider: 07/26/18 01:57


HPI/ROS: 





HPI





CHIEF COMPLAINT:  Constipation, urge to go 





HISTORY OF PRESENT ILLNESS:  Very pleasant 53-year-old male, history of insulin-

dependent diabetes, renal failure, on hemodialysis in his left arm AV fistula 

presents emergency room with 2-3 days of constipation.  Patient states that he 

has been trying to use the bathroom multiple times been unable to do so.  He 

feels very constipated.  He complains of lateral rectal pressure rectal pain.  

Denies any bleeding, denies vomiting, denies fever, denies abdominal pain.  

States he has the urge to go every 30 min but is unable to do so.  Does state 

he had a colonoscopy 1 week ago.





Past Medical History:  Insulin-dependent diabetes, renal failure on hemodialysis





Past Surgical History:  left arm AV fistula





Social History:  Denies drugs alcohol tobacco.





Family History:  Noncontributory








ROS   


REVIEW OF SYSTEMS:


A comprehensive 10 point review of systems is otherwise negative aside from 

elements mentioned in the history of present illness.








Exam   


Constitutional   appears well nontoxic no acute distress triage nursing summary 

reviewed, vital signs reviewed, awake/alert. 


Eyes   normal conjunctivae and sclera, EOMI, PERRLA. 


HENT   normal inspection, atraumatic, moist mucus membranes, no epistaxis, neck 

supple/ no meningismus, no raccoon eyes. 


Respiratory   clear to auscultation bilaterally, normal breath sounds, no 

respiratory distress, no wheezing. 


Cardiovascular   rate normal, regular rhythm, no murmur, no edema, distal 

pulses normal. 


Gastrointestinal   soft, non-tender, no rebound, no guarding, normal bowel 

sounds, no distension, no pulsatile mass. 


Genitourinary   no CVA tenderness. 


Musculoskeletal  left upper extremity AV fistula, no midline vertebral 

tenderness, full range of motion, no calf swelling, no tenderness of extremities

, no meningismus, good pulses, neurovascularly intact.


Skin   pink, warm, & dry, no rash, skin atraumatic. 


Neurologic   awake, alert and oriented x 3, AAOx3, moves all 4 extremities 

equally, motor intact, sensory intact, CN II-XII intact, normal cerebellar, 

normal vision, normal speech. 


Psychiatric   normal mood/affect. 


Heme/Lymph/Immune   no lymphadenopathy.





Differential Diagnosis:  Includes but is not limited to in a particular order:  

Constipation, ileus, bowel obstruction, fecal impaction





Medical Decision Making:


Plan for this patient KUB to rule out abnormal bowel gas pattern.  If no 

evidence of obstructive pathology will proceed with enema.








Re-evaluation:








0216:  Patient's KUB shows significant amount of stool burden mainly to the 

ascending transverse and descending colon.  Rectal vault.  No free air no 

evidence of obstruction





Plan for this patient will give soapsuds enema.





And re-evaluate.





Stool softener MiraLax at home.








Patient had a soapsuds enema with good bowel movement is feeling much better.  

Denies any rectal pain or abdominal pain.  States he feels much better would 

like to go home.





- Personal History


Current Tetanus/Diphtheria Vaccine: Unsure


Current Tetanus Diphtheria and Acellular Pertussis (TDAP): Unsure





- Medical/Surgical History


Hx Asthma: No


Hx Chronic Respiratory Disease: No


Hx Diabetes: Yes


Hx Cardiac Disease: No


Hx Renal Disease: Yes


Hx Cirrhosis: No


Hx Alcoholism: No


Hx HIV/AIDS: No


Hx Splenectomy or Spleen Trauma: No


Other PMH: PMHx: Diabetes, hypertension, kidney disease/renal failure - 

dialysis.  PSHx: L upper arm dialysis shunt





- Social History


Smoking Status: Never smoked


Constitutional: 


 Initial Vital Signs











Temperature (C)  36.9 C   07/26/18 01:50


 


Heart Rate  81   07/26/18 01:50


 


Respiratory Rate  16   07/26/18 01:50


 


Blood Pressure  159/85 H  07/26/18 01:50


 


O2 Sat (%)  97   07/26/18 01:50








 











O2 Delivery Mode               Room Air














Allergies/Adverse Reactions: 


 





No Known Allergies Allergy (Verified 03/01/17 17:38)


 








Home Medications: 














 Medication  Instructions  Recorded


 


Atorvastatin Calcium [Lipitor 40 40 mg PO HS 03/02/17





mg (*)]  


 


Calcium Carbonate [Tums 500MG (*)] 1,000 mg PO TIDMEAL 03/02/17


 


Insulin Aspart Novolog 70/30 55 units SC BIDAC 03/02/17





[Novolog Mix 70/30 (*)]  


 


Linagliptin [Tradjenta] 5 mg PO DAILY 03/02/17


 


Metoprolol Tartrate [Lopressor 100 100 mg PO BID 03/02/17





mg (*)]  


 


Calcitriol [Calcitriol (*)] 0.25 mcg PO MWF #15 cap 03/10/17


 


Calcium Acetate [Phoslo (*)] 1,334 mg PO TIDMEAL #90 cap 03/10/17


 


NIFEdipine ER [Adalat CC 30 mg (*)] 30 mg PO BID #60 tab 03/10/17


 


hydrALAZINE [Apresoline] 25 mg PO TID #90 tab 03/10/17


 


Docusate Sodium [Dulcolax Stool 100 mg PO DAILY #14 capsule 07/26/18





Softener]  


 


Polyethylene Glycol 3350 [Miralax 17 gm PO DAILY #4 pkt 07/26/18





17 gm (*)]  














Departure





- Departure


Disposition: Home, Routine, Self-Care


Clinical Impression: 


Constipation


Qualifiers:


 Constipation type: slow transit constipation Qualified Code(s): K59.01 - Slow 

transit constipation





Condition: Good


Instructions:  Constipation (ED)


Additional Instructions: 


1. Increase your fruits and vegetables drink lots of water


2. Stool softeners and MiraLax as prescribed.


3. Return emergency room if there is worsening abdominal pain fever vomiting.


Prescriptions: 


Docusate Sodium [Dulcolax Stool Softener] 100 mg PO DAILY #14 capsule


Polyethylene Glycol 3350 [Miralax 17 gm (*)] 17 gm PO DAILY #4 pkt

## 2018-09-12 ENCOUNTER — HOSPITAL ENCOUNTER (OUTPATIENT)
Dept: HOSPITAL 80 - FED | Age: 53
Setting detail: OBSERVATION
LOS: 1 days | Discharge: HOME | End: 2018-09-13
Attending: INTERNAL MEDICINE | Admitting: INTERNAL MEDICINE
Payer: COMMERCIAL

## 2018-09-12 DIAGNOSIS — E87.5: ICD-10-CM

## 2018-09-12 DIAGNOSIS — N18.6: ICD-10-CM

## 2018-09-12 DIAGNOSIS — Z23: ICD-10-CM

## 2018-09-12 DIAGNOSIS — I10: ICD-10-CM

## 2018-09-12 DIAGNOSIS — Z99.2: ICD-10-CM

## 2018-09-12 DIAGNOSIS — R42: ICD-10-CM

## 2018-09-12 DIAGNOSIS — E11.649: ICD-10-CM

## 2018-09-12 DIAGNOSIS — R07.9: Primary | ICD-10-CM

## 2018-09-12 DIAGNOSIS — E11.22: ICD-10-CM

## 2018-09-12 LAB
INR PPP: 1.02 (ref 0.83–1.16)
PLATELET # BLD: 224 10^3/UL (ref 150–400)
PROTHROMBIN TIME: 13.6 SEC (ref 12–15)

## 2018-09-12 PROCEDURE — 93005 ELECTROCARDIOGRAM TRACING: CPT

## 2018-09-12 PROCEDURE — 96372 THER/PROPH/DIAG INJ SC/IM: CPT

## 2018-09-12 PROCEDURE — 5A1D70Z PERFORMANCE OF URINARY FILTRATION, INTERMITTENT, LESS THAN 6 HOURS PER DAY: ICD-10-PCS

## 2018-09-12 PROCEDURE — 90471 IMMUNIZATION ADMIN: CPT

## 2018-09-12 PROCEDURE — G0378 HOSPITAL OBSERVATION PER HR: HCPCS

## 2018-09-12 PROCEDURE — 90472 IMMUNIZATION ADMIN EACH ADD: CPT

## 2018-09-12 PROCEDURE — 71045 X-RAY EXAM CHEST 1 VIEW: CPT

## 2018-09-12 PROCEDURE — 93017 CV STRESS TEST TRACING ONLY: CPT

## 2018-09-12 PROCEDURE — 99285 EMERGENCY DEPT VISIT HI MDM: CPT

## 2018-09-12 PROCEDURE — G0009 ADMIN PNEUMOCOCCAL VACCINE: HCPCS

## 2018-09-12 PROCEDURE — G0008 ADMIN INFLUENZA VIRUS VAC: HCPCS

## 2018-09-12 RX ADMIN — ATORVASTATIN CALCIUM SCH MG: 40 TABLET, FILM COATED ORAL at 20:04

## 2018-09-12 NOTE — GHP
DATE OF ADMISSION:  09/12/2018



CHIEF COMPLAINT:  Body aches and cramps after dialysis.



HISTORY:  This is a 53-year-old, end-stage renal disease patient, who is on his 
usual Monday, Wednesday, Friday routine of hemodialysis, presenting to the ER 
after developing significant cramping and generalized pain during dialysis this 
morning.  He notes the cramping was severe and he also had some weak chest pain 
at the same time.  He left dialysis and went home and continued to have severe 
cramps in his legs.  He went outside and had an episode of what sounds like 
near syncope where he felt very weak and dizzy and was essentially unable to 
walk back into the house without assistance.  He notes he has never had similar 
symptoms in the past, although he has had some mild cramping previously with 
dialysis when they have taken off too much fluid.  At the time of my evaluation
, he states he feels "100% better" after receiving some fluids and some food in 
the emergency department.



PAST MEDICAL HISTORY:  Includes:

1.  Diabetes.  He is a type 1 diabetic.

2.  Hypertension.

3.  End-stage renal disease, on chronic hemodialysis.

4.  Hyperlipidemia.



PAST SURGICAL HISTORY:  Includes AV fistula placement.



SOCIAL HISTORY:  Patient lives in [place] with his wife and son.  He denies 
alcohol, tobacco, or illicit drug use.  He works in a [employer].



REVIEW OF SYSTEMS:  10-point review of systems obtained, negative except as per 
HPI.



MEDICATIONS:  Include:

1.  Hydralazine.

2.  Valsartan.

3.  Nifedipine.

4.  Metoprolol.

5.  Tradjenta.

6.  Lanthanum.

7.  Insulin aspirate.

8.  Gabapentin.

9.  Dialyvite with iron.

10.  Calcium acetate.

11.  Calcitriol.

12.  Atorvastatin.



ALLERGIES:  No known drug allergies.



PHYSICAL EXAM:  VITAL SIGNS:  /98, heart rate 80, respiratory rate 16, O2 
sats 98% on room air.  Temperature is 36.7.  GENERAL APPEARANCE:  This is a well
-developed, well-nourished man.  He is in no acute distress.  EYES:  Anicteric.
  HENT:  Oropharynx clear.  CARDIOVASCULAR:  Regular rate and rhythm, no MRG.  
PULMONARY:  CTA bilaterally.  Normal work of breathing.  ABDOMEN:  Soft, 
nontender, nondistended.  EXTREMITIES:  No clubbing, cyanosis, or edema.  SKIN:
  Warm, dry, well perfused.  NEURO/PSYCH:  Oriented, appropriate, pleasant.



CLINICAL DATA:  Labs reviewed notable for white blood cell count of 6.9, 
hematocrit of 30.8, platelets of 224.  Coags are normal.  Chemistry notable for 
initial glucose of 52, creatinine 5.9.  



Chest x-ray personally reviewed and interpreted, shows no acute findings. 



EKG, personally reviewed and interpreted, shows sinus rhythm, evidence of LVH, 
otherwise unremarkable.



ASSESSMENT AND PLAN:  This is a 53-year-old man, end-stage renal disease, 
currently undergoing hemodialysis, presenting with leg cramps and near-syncope 
from the dialysis unit.

1.  Leg cramps/near syncope.  All in all, this sounds most consistent with 
perhaps too much fluid removal. Could also be a contribution of hypoglycemia as 
next. This was discussed with Dr. Perez of Renal who agrees that for now 
plan should be to give back some gentle hydration and monitor.  His 
electrolytes appear normal at this time, although we will add on magnesium and 
phos just to be complete.  At the time being, the patient states he feels 100% 
better, but does understand that given the severity of his presenting symptoms, 
he will be monitored overnight.

2. Hypoglycemia: present on admission in the setting of above, will monitor on 
SSI for now and resume his home regimen if his sugars remain in normal range. 
Will check HgbA1c. 

3.  Chest pressure.  This does seem to have been a mild and brief episode of 
chest pressure in association with the all-over body pain the patient was 
experiencing.  His EKG is not clearly ischemic, though he does have some LVH 
and repolarization abnormality.  We will trend troponins overnight.  We will 
monitor on telemetry.

4.  Near syncope.  As per above, I do suspect this is secondary to excessive 
fluid removal during dialysis.

5.  End-stage renal disease.  Renal has been consulted and will see patient in 
the morning.  He will be resumed on his home medications.  He will likely 
continue his usual thrice weekly hemodialysis and will need to consider if any 
changes need to be made in his regimen to avoid recurrent episodes such as today
's.

6.  Hypertension.  Currently, blood pressure has been significantly elevated.  
This in the setting of patient vomiting up his morning meds.  Will provide 
p.r.n. IV hydralazine and restart his home medications and monitor.

7.  Hyperlipidemia.  We will continue his statin.

8.  Disposition:  Observation status.  Suspect patient will require less than 48
-hour stay for evaluation and management of above.

Patient is new to my care.  Old records reviewed, summarized as per HPI and 
past medical history.  Care plan reviewed with ER physician and Dr. Perez 
of Renal.  Further history obtained from patient's son present at bedside.





Job #:  [id number]

MTDD

## 2018-09-12 NOTE — EDPHY
H & P


Time Seen by Provider: 09/12/18 12:50


HPI/ROS: 





Chief complaint.  Chest pain





HPI.  53-year-old male presents emergency department with multiple full 

complaints.  He is an end-stage renal failure patient on dialysis.  He was 

feeling a little bit tired when he went to dialysis and then at 10:00 a.m. 

Which was about 15 min prior to the dialysis he developed some central chest 

discomfort that he describes as"pain.  It is still present but better.  There 

was no radiation.  His left arm felt tired but there was no discomfort to the 

arm.  He had some cramping into the right thigh.  His chest discomfort was not 

worse with exertion or deep breathing.  He went home and had lunch in his 

discomfort was not worse eating although then he tells me it was worse after 

eating.  He went outside in the yd and felt generally weak and dizzy and could 

not get back into the house unassisted.  He says he had trouble with his vision 

which is now back to normal.  He felt dizzy and weak.  He did throw up once.  

The weakness was generalized.  He is feeling better now.  Tells me has no heart 

history and no family history of coronary artery disease





ROS


Constitutional.  Generalized weakness


Eyes.  Trouble with vision transiently


ENT.  no sore throat, no nasal drainage


Cardiovascular.  Central chest pain


Respiratory.  No trouble breathing or cough


Abdominal.  Some cramping to both sides of his abdomen but that has resolved 

and he has no pain now


.  Dialysis still makes some urine


MS.  Right thigh cramping however no swelling and this is now resolved


Skin.  no rash


Lymph.  no swollen glands


Neuro.  Dizziness and difficulty walking


Past Medical/Surgical History: 





Past medical history is significant for diabetes, hypertension, chronic renal 

failure on dialysis


Social History: 





, nonsmoker, no alcohol


Smoking Status: Never smoked


Physical Exam: 





General Appearance:  Alert well-developed male mild distress.


Eyes: Pupils equal and round no pallor or injection.


ENT, Mouth:  Mucous membranes are moist.


Respiratory:  There are no retractions, lungs are clear to auscultation.


Cardiovascular: Regular rate and rhythm.


Gastrointestinal:   Abdomen is soft and nontender, no masses, bowel sounds 

normal.


Neurological:  Awake and alert, sensory and motor exams grossly normal.


Skin: Warm and dry, no rashes.


Musculoskeletal:  Neck is supple nontender.


Extremities  symmetrical, full range of motion.  No obvious swelling.


Psychiatric: Patient is oriented X 3, there is no agitation.


Constitutional: 


 Initial Vital Signs











Temperature (C)  36.7 C   09/12/18 12:50


 


Heart Rate  81   09/12/18 12:50


 


Respiratory Rate  20   09/12/18 12:50


 


Blood Pressure  118/76   09/12/18 12:50


 


O2 Sat (%)  94   09/12/18 12:50








 











O2 Delivery Mode               Room Air














Allergies/Adverse Reactions: 


 





No Known Allergies Allergy (Verified 09/12/18 12:45)


 








Home Medications: 














 Medication  Instructions  Recorded


 


Atorvastatin Calcium [Lipitor 40 40 mg PO HS 03/02/17





mg (*)]  


 


Calcium Carbonate [Tums 500MG (*)] 1,000 mg PO TIDMEAL 03/02/17


 


Insulin Aspart Novolog 70/30 55 units SC BIDAC 03/02/17





[Novolog Mix 70/30 (*)]  


 


Linagliptin [Tradjenta] 5 mg PO DAILY 03/02/17


 


Metoprolol Tartrate [Lopressor 100 100 mg PO BID 03/02/17





mg (*)]  


 


Calcitriol [Calcitriol (*)] 0.25 mcg PO MWF #15 cap 03/10/17


 


Calcium Acetate [Phoslo (*)] 1,334 mg PO TIDMEAL #90 cap 03/10/17


 


NIFEdipine ER [Adalat CC 30 mg (*)] 30 mg PO BID #60 tab 03/10/17


 


hydrALAZINE [Apresoline] 25 mg PO TID #90 tab 03/10/17


 


Docusate Sodium [Dulcolax Stool 100 mg PO DAILY #14 capsule 07/26/18





Softener]  


 


Polyethylene Glycol 3350 [Miralax 17 gm PO DAILY #4 pkt 07/26/18





17 gm (*)]  














Medical Decision Making





- Diagnostics


EKG Interpretation: 





EKG interpreted by me shows normal sinus rhythm normal interval and axis.  QRS 

shows LVH.  There is no significant ST elevation or depression.  There is 1 

Pac.  The rate is 77





No change from previous 3/17


Imaging Results: 


 Imaging Impressions





Chest X-Ray  09/12/18 13:27


Impression: Negative.


 











Procedures: 





IV normal saline, monitor


ED Course/Re-evaluation: 





[place]imbabwean interpretor Jacinto





On re-evaluation the patient tells me he is feeling better





At about 3:25 p.m. Patient is a has terrible leg cramps.





He is given a little bit of fluids at 3:45 a.m. No longer has pain





Patient and I and family have discussed laboratory an EKG evaluation.  We 

discussed treatment plan including recommendation for admission.  They 

expressed understanding and agreement





I consulted discussed case with Dr. Ermias arzate, hospitalist, who agrees to the 

admission





I consulted discussed the case with Dr. Perez for nephrology who will see 

the patient in consultation


Differential Diagnosis: 





Patient with end-stage renal failure with chest pain and syncope today.  I 

considered hypotension after dialysis, electrolyte abnormality a after dialysis

, acute coronary syndrome.





- Data Points


Laboratory Results: 


 Laboratory Results





 09/12/18 13:06 





 09/12/18 13:06 





 











  09/12/18 09/12/18 09/12/18





  15:36 15:06 13:06


 


WBC      





    


 


RBC      





    


 


Hgb      





    


 


Hct      





    


 


MCV      





    


 


MCH      





    


 


MCHC      





    


 


RDW      





    


 


Plt Count      





    


 


MPV      





    


 


Neut % (Auto)      





    


 


Lymph % (Auto)      





    


 


Mono % (Auto)      





    


 


Eos % (Auto)      





    


 


Baso % (Auto)      





    


 


Nucleat RBC Rel Count      





    


 


Absolute Neuts (auto)      





    


 


Absolute Lymphs (auto)      





    


 


Absolute Monos (auto)      





    


 


Absolute Eos (auto)      





    


 


Absolute Basos (auto)      





    


 


Absolute Nucleated RBC      





    


 


Immature Gran %      





    


 


Immature Gran #      





    


 


PT      





    


 


INR      





    


 


APTT      





    


 


Sodium      143 mEq/L mEq/L





     (135-145) 


 


Potassium      3.8 mEq/L mEq/L





     (3.3-5.0) 


 


Chloride      93 mEq/L L mEq/L





     () 


 


Carbon Dioxide      33 mEq/l H mEq/l





     (22-31) 


 


Anion Gap      17 mEq/L H mEq/L





     (8-16) 


 


BUN      41 mg/dL H mg/dL





     (7-23) 


 


Creatinine      5.9 mg/dL H mg/dL





     (0.7-1.3) 


 


Estimated GFR      10 





    


 


Glucose      74 mg/dL mg/dL





     () 


 


POC Glucose  52 mg/dL L mg/dL    





   ()   


 


Calcium      10.3 mg/dL mg/dL





     (8.5-10.4) 


 


POC Troponin I    0.01 ng/mL ng/mL  





    (0.00-0.08)  














  09/12/18 09/12/18 09/12/18





  13:06 13:06 13:04


 


WBC    6.95 10^3/uL 10^3/uL  





    (3.80-9.50)  


 


RBC    3.34 10^6/uL L 10^6/uL  





    (4.40-6.38)  


 


Hgb    10.7 g/dL L g/dL  





    (13.7-17.5)  


 


Hct    30.8 % L %  





    (40.0-51.0)  


 


MCV    92.2 fL fL  





    (81.5-99.8)  


 


MCH    32.0 pg pg  





    (27.9-34.1)  


 


MCHC    34.7 g/dL g/dL  





    (32.4-36.7)  


 


RDW    13.2 % %  





    (11.5-15.2)  


 


Plt Count    224 10^3/uL 10^3/uL  





    (150-400)  


 


MPV    9.5 fL fL  





    (8.7-11.7)  


 


Neut % (Auto)    40.8 % %  





    (39.3-74.2)  


 


Lymph % (Auto)    37.0 % %  





    (15.0-45.0)  


 


Mono % (Auto)    10.6 % %  





    (4.5-13.0)  


 


Eos % (Auto)    10.6 % H %  





    (0.6-7.6)  


 


Baso % (Auto)    0.7 % %  





    (0.3-1.7)  


 


Nucleat RBC Rel Count    0.0 % %  





    (0.0-0.2)  


 


Absolute Neuts (auto)    2.83 10^3/uL 10^3/uL  





    (1.70-6.50)  


 


Absolute Lymphs (auto)    2.57 10^3/uL 10^3/uL  





    (1.00-3.00)  


 


Absolute Monos (auto)    0.74 10^3/uL 10^3/uL  





    (0.30-0.80)  


 


Absolute Eos (auto)    0.74 10^3/uL H 10^3/uL  





    (0.03-0.40)  


 


Absolute Basos (auto)    0.05 10^3/uL 10^3/uL  





    (0.02-0.10)  


 


Absolute Nucleated RBC    0.00 10^3/uL 10^3/uL  





    (0-0.01)  


 


Immature Gran %    0.3 % %  





    (0.0-1.1)  


 


Immature Gran #    0.02 10^3/uL 10^3/uL  





    (0.00-0.10)  


 


PT  13.6 SEC SEC    





   (12.0-15.0)   


 


INR  1.02     





   (0.83-1.16)   


 


APTT  28.7 SEC SEC    





   (23.0-38.0)   


 


Sodium      





    


 


Potassium      





    


 


Chloride      





    


 


Carbon Dioxide      





    


 


Anion Gap      





    


 


BUN      





    


 


Creatinine      





    


 


Estimated GFR      





    


 


Glucose      





    


 


POC Glucose      





    


 


Calcium      





    


 


POC Troponin I      0.01 ng/mL ng/mL





     (0.00-0.08) 











Medications Given: 


 








Discontinued Medications





Sodium Chloride (Ns)  500 mls @ 3,000 mls/hr IV ONCE ONE


   Stop: 09/12/18 15:43


   Last Admin: 09/12/18 15:35 Dose:  500 mls





Point of Care Test Results: 


 Chemistry











  09/12/18 09/12/18 09/12/18





  15:36 15:06 13:04


 


POC Glucose  52 mg/dL L mg/dL    





   ()   


 


POC Troponin I    0.01 ng/mL ng/mL  0.01 ng/mL ng/mL





    (0.00-0.08)   (0.00-0.08) 














Departure





- Departure


Disposition: [facility] Inpatient Acute


Clinical Impression: 


 Syncope and collapse





Chest pain


Qualifiers:


 Chest pain type: unspecified Qualified Code(s): R07.9 - Chest pain, unspecified





Condition: Fair


Referrals: 


NONE *PRIMARY CARE P,. [Primary Care Provider] - As per Instructions

## 2018-09-12 NOTE — CPEKG
Test Reason : OPEN

Blood Pressure : ***/*** mmHG

Vent. Rate : 077 BPM     Atrial Rate : 078 BPM

   P-R Int : 111 ms          QRS Dur : 095 ms

    QT Int : 416 ms       P-R-T Axes : -04 071 054 degrees

   QTc Int : 471 ms

 

Sinus rhythm

Atrial premature complexes

Probable left ventricular hypertrophy

ST elev, probable normal early repol pattern

 

Confirmed by Saul Ovalle ([id number]) on 9/12/2018 1:29:08 PM

 

Referred By:             Confirmed By:Saul Ovalle

## 2018-09-13 VITALS — DIASTOLIC BLOOD PRESSURE: 78 MMHG | SYSTOLIC BLOOD PRESSURE: 138 MMHG

## 2018-09-13 LAB
HEPATITIS B CORE AB TOTAL: NEGATIVE
HEPATITIS B SURFACE ANTIGEN: NEGATIVE
PLATELET # BLD: 200 10^3/UL (ref 150–400)

## 2018-09-13 RX ADMIN — CALCIUM ACETATE SCH MG: 667 CAPSULE ORAL at 18:45

## 2018-09-13 RX ADMIN — CALCIUM ACETATE SCH MG: 667 CAPSULE ORAL at 09:42

## 2018-09-13 RX ADMIN — METOPROLOL TARTRATE SCH MG: 100 TABLET, FILM COATED ORAL at 09:43

## 2018-09-13 RX ADMIN — INSULIN LISPRO SCH: 100 INJECTION, SOLUTION INTRAVENOUS; SUBCUTANEOUS at 13:07

## 2018-09-13 RX ADMIN — CALCIUM ACETATE SCH MG: 667 CAPSULE ORAL at 13:07

## 2018-09-13 RX ADMIN — INSULIN LISPRO SCH: 100 INJECTION, SOLUTION INTRAVENOUS; SUBCUTANEOUS at 19:12

## 2018-09-13 RX ADMIN — INSULIN LISPRO SCH UNITS: 100 INJECTION, SOLUTION INTRAVENOUS; SUBCUTANEOUS at 09:49

## 2018-09-13 RX ADMIN — INSULIN ASPART SCH UNITS: 100 INJECTION, SUSPENSION SUBCUTANEOUS at 09:44

## 2018-09-13 RX ADMIN — INSULIN ASPART SCH: 100 INJECTION, SUSPENSION SUBCUTANEOUS at 20:18

## 2018-09-13 RX ADMIN — METOPROLOL TARTRATE SCH MG: 100 TABLET, FILM COATED ORAL at 18:59

## 2018-09-13 RX ADMIN — ATORVASTATIN CALCIUM SCH MG: 40 TABLET, FILM COATED ORAL at 20:14

## 2018-09-13 NOTE — CPR
DATE OF PROCEDURE:  09/13/2018



PROCEDURE:  Treadmill stress test.



REASON FOR TEST:  Mild chest pain on admission, lightheadedness and weakness 
after dialysis yesterday. 



Resting EKG shows a regular sinus rhythm with an occasional PVC and occasional 
PAC.  Ventricular rate 73.  Resting blood pressure 142/86.  He is asymptomatic 
prior to testing.



STRESS PORTION:  Treadmill stress test using the Ray protocol. 



He was exercised for a total of 9 minutes.  Testing was stopped due to 
increased fatigue.  EKG remained stable throughout testing.  Occasional PAC was 
noted.  No PVCs with testing.  He had no chest pain, shortness of breath, or 
lightheadedness. 



Max blood pressure 166/84, max heart rate 100. His HR was Blunted with exercise.



RECOVERY:  He spontaneous recovered with his heart rate returning to 81.  
Recovery blood pressure 142/78.  Recovery heart rate 79 with one PAC.  There 
were no ischemic changes with testing.  He is stable to return to his room with 
no further cardiac testing.





Job #:  [id number]

MTDD

## 2018-09-13 NOTE — GCON
NEPHROLOGY CONSULTATION



DATE OF CONSULTATION:  09/13/2018





REASON FOR CONSULTATION:  Syncope and cramping, end-stage renal disease.



HISTORY OF PRESENT ILLNESS:  The patient is a very pleasant 53-year-old male with a past medical hist
ory significant for end-stage renal disease secondary to diabetes and hypertension.  The patient pres
ented with uremia approximately a year and a half ago, and dialysis was initiated at that time.  Sin
e that time, he has done relatively well.  He dialyzes at the [facility] on a Monday, 
Wednesday, Friday schedule.  He is under the care of Dr. Hannah Choi of [facility].  Th
e patient is on the transplant list at [facility]. 



The patient's dialysis runs have been relatively stable.  He reports occasional mild cramping, but no
 other issues.  He has an AV fistula that has been working well. 



Yesterday, the patient underwent his dialysis in a routine fashion.  Toward the end of dialysis, he d
id have some cramping.  At that time, his systolic blood pressure dropped to approximately 100.  His 
ultrafiltration was reduced, and he was given a small amount of normal saline.  This did resolve his 
cramps.  The patient was discharged to home with a systolic blood pressure of 124/70.  Of note, 3.7 L
 of fluid were removed.  His dry weight was listed as 72.5, and he left at 74.4 kg. 



Upon returning home, the patient was fine until he ate lunch.  Shortly thereafter, he began developin
g cramps, some chest pressure, and dizziness.  He did become fully syncopal.  The patient ultimately 
was transported to the [facility].  He received some IV fluids and felt okay after that t
miguel.  Here in the hospital, he has been hemodynamically stable, and his orthostatics have been negati
ve.  He has no current complaints and feels good overall.  



On further questioning with the patient, he does take some hydralazine following his dialysis runs.  
It does sound as if he did decompensate following lunch, after he ate and took his hydralazine.  As r
elated to the above issues, we are asked by the emergency service to assist the patient's renal diagn
osis and management.



PAST MEDICAL HISTORY:  

1.  End-stage renal disease secondary to diabetes.  The patient did have a renal biopsy 1 year ago.

2.  Diabetes type 1.

3.  Hypertension.

4.  Hyperlipidemia.



PAST SURGICAL HISTORY:  AV fistula placement.



HOME MEDICATIONS:  Hydralazine 50 mg t.i.d., except on dialysis days, when he takes 25 mg in morning,
 25 mg at lunch, and 50 mg in the evening; valsartan 160 mg daily; nifedipine 30 mg b.i.d.; metoprolo
l 100 mg b.i.d.; Tradjenta 5 mg daily; lanthanum carbonate with meals; insulin 70/30, 22 units subcu 
b.i.d.; gabapentin 100 mg daily; calcium acetate 667 mg tablets 2 tablets t.i.d. with meals; calcitri
ol 0.25 mcg every Monday, Wednesday, and Friday; atorvastatin 40 mg q.h.s.



FAMILY HISTORY:  The patient lives with his family.  He previously worked in the  industr
y and in nursing homes.  He does not smoke cigarettes or drink alcohol.



REVIEW OF SYSTEMS:  A 12-systems review is obtained.  The patient did have a headache yesterday, but 
otherwise does not have headaches.  He is not having any infectious symptoms.  He feels that he has o
ccasional cough on dialysis.  He has aforementioned occasional cramps on dialysis.  He has decreased 
vision due to retinopathy.  He has diabetes.  He still makes some urine.  The patient states he did h
ave diarrhea for a period of time during 2017; this has improved.  All of his other review of systems
 was negative.



PHYSICAL EXAM:  GENERAL:  At time of exam, the patient is pleasant, appropriate, and alert.  VITAL SI
GNS:  Temperature 36.8, pulse 77, blood pressure 137/70.  HEENT:  Eyes:  Sclerae clear.  Oropharynx c
lear.  NECK:  No lymphadenopathy or thyromegaly.  LUNGS:  Clear to auscultation bilaterally.  CARDIOV
ASCULAR:  Positive S4.  Normal S1, S2.  No murmurs, gallops, rubs.  ABDOMEN:  Nontender.  UPPER EXTRE
MITIES:  The patient has left upper extremity fistula site looks good.  /RECTAL:  Deferred.  LOWER 
EXTREMITIES:  No edema.  INTEGUMENT:  Generally clear.  NEURO:  No focal findings.



LABORATORY STUDIES:  White count 6.3, hematocrit 29.5, platelets 200.  Sodium 138, potassium 5.7, chl
oride 93, bicarb 31, creatinine 8.  Troponin unmeasurable.



IMPRESSION AND PLAN:  

1.  Syncope following dialysis.  The patient has typically been hemodynamically stable.  At present, 
he looks excellent and has no complaints.  Per his report, he had a negative stress test earlier in t
he year.  He has not been having significant cardiac symptoms or previous instability.

2.  I really cannot find much different about yesterday's dialysis than the norm.  However, we will m
elena adjustments.  I have already called the dialysis unit relating to these.  We will increase his dr
karuna weight to 75 kg.  We will cap his maximum ultrafiltration to 3 L net per treatment.  The patient wi
ll stop taking a dose of hydralazine following his dialysis on dialysis days.  We will follow him wit
h these changes.  The patient is agreeable to the plan.

3.  Hyperkalemia.  The patient had an elevated potassium today, which is not expected.  We will do a 
stat repeat of this.  Pending this result, we will determine need for dialysis today versus following
 up with his outpatient dialysis tomorrow.

4.  Anemia.  This is within his expected range.

5.  Diabetes.  The patient's last hemoglobin A1c is 7.1, which is excellent for a patient on dialysis
. 



Thank you for allowing us to participate in this gentleman's care.  We will continue to follow him cl
osely with you.





Job #:  [id number]

## 2018-09-13 NOTE — HOSPPROG
Hospitalist Progress Note


Assessment/Plan: 





DISCHARGE DIAGNOSES:


* near-syncope


* chest pain, resolved; ruled out for MI


* suspected volume depletion after hemodialysis session


* hypoglycemia episodes


* hyperkalemia


* nausea and vomiting





CONSULTANTS:


Dr. Kendell Perez





PROCEDURES:


Hemodialysis





HOSPITAL COURSE SUMMARY:


This patient came to the hospital after feeling chest pain lightheadedness 

weakness and malaise at the end of the dialysis session.  He had a near syncope 

spell.  There was some nausea and vomiting as well.  Upon arrival here to the 

hospital the patient was feeling still very weak and tired.  He was given IV 

fluids and responded very well to that feels back to normal at this time.  It 

is felt that he had come to hypovolemia from this particular dialysis session.  

In addition the patient was taking hydralazine three times daily with a reduced 

dose of 25 mg after his dialysis sessions.  Is recommended now that he skip 

that dose after dialysis sessions altogether.





Additionally the patient did have hypoglycemia at the time of admission.  It is 

unclear exactly how much this contribute to his symptoms but is felt that it 

probably did have a impact.  He did not receive his nighttime insulin dose on 

the admission day.  The following morning he did receive his insulin dose as 

usual, but not his Tradjenta as it is non formulary here and he did not bring 

it with him.  Nonetheless he did have again at mid day today hypoglycemia in 

the 60s similar to his hypoglycemia in the 50s yesterday at the same time.  At 

this point will be decreasing his insulin dose as discussed below.





As the patient had chest discomfort and nausea vomiting with weakness and near 

syncope at the end of dialysis, and since he is diabetic and a renal failure 

patient with high risk for coronary disease, he underwent at treadmill stress 

testing here.  This test was unremarkable with 9 min of exercise, good heart 

rate and blood pressure responses in recovery, absence of anginal symptoms, 

absence of ischemic EKG changes.  This is a low risk test and unless more 

concerning cardiac sending symptoms developed further cardiac testing or risk 

stratification measures are not indicated at present





PENDING TEST RESULTS:


None





MEDICATION CHANGES:


He is instructed to not take his mid day hydralazine dose on dialysis days 

after the session


Insulin dose decreased to 18 units twice daily





FOLLOW-UP PLAN:


At dialysis clinic on his usual Monday Wednesday Friday schedule


With his primary diabetes manager.  He is instructed to keep careful monitoring 

of his midday sugars to be sure he is not having lows





Greater than 35 minutes bedside and care coordination time today


Objective: 


 Vital Signs











Temp Pulse Resp BP Pulse Ox


 


 36.6 C   76   12   114/65   92 


 


 09/13/18 12:05  09/13/18 12:05  09/13/18 12:05  09/13/18 12:05  09/13/18 12:05








 Laboratory Results





 09/13/18 03:59 





 09/13/18 09:25 





 











 09/12/18 09/13/18 09/14/18





 06:59 06:59 06:59


 


Intake Total  840 


 


Output Total  25 


 


Balance  815 








 











PT  13.6 SEC (12.0-15.0)   09/12/18  13:06    


 


INR  1.02  (0.83-1.16)   09/12/18  13:06    














ICD10 Worksheet


Patient Problems: 


 Problems











Problem Status Onset


 


Chest pain Acute  


 


Syncope and collapse Acute  


 


Hyperkalemia Acute  


 


Renal failure, acute Acute  


 


Uremia Acute  


 


Volume overload Acute

## 2018-09-13 NOTE — ASMTCASEMG
Living Arrangements

 

What is your living           Answers:  With Spouse                           

arrangement? Who do you                                                       

live with?                                                                    

Type Of Residence

 

What kind of residence do     Answers:  Apartment                             

you live in?                                                                  

Discharge Plan Comments

 

Coordination Status           

Comments                      

Notes:

Pts case discussed in tx rounds. Pt is [place] speaking only. Pt is a 54 y/o man admitted for 

syncope. Pt with a hx of ESRD. Pt goes to dialysis [facility]. Nephrology has been consulted. Pt will have 


a stress test at some point. Pt will most likely d/c independent when medically stable. No 

therapies ordered at this time. CM available for changes.







Plan: Independent 

 

Date Signed:  09/13/2018 01:55 PM

Electronically Signed By:DODIE Tafoya

## 2018-09-13 NOTE — PDDCSUM
Discharge Summary


Discharge Summary: 





DISCHARGE DIAGNOSES:


* near-syncope


* chest pain, resolved; ruled out for MI


* suspected volume depletion after hemodialysis session


* hypoglycemia episodes


* hyperkalemia


* nausea and vomiting





CONSULTANTS:


Dr. Kendell Perez





PROCEDURES:


Hemodialysis





HOSPITAL COURSE SUMMARY:


This patient came to the hospital after feeling chest pain lightheadedness 

weakness and malaise at the end of the dialysis session.  He had a near syncope 

spell.  There was some nausea and vomiting as well.  Upon arrival here to the 

hospital the patient was feeling still very weak and tired.  He was given IV 

fluids and responded very well to that feels back to normal at this time.  It 

is felt that he had come to hypovolemia from this particular dialysis session.  

In addition the patient was taking hydralazine three times daily with a reduced 

dose of 25 mg after his dialysis sessions.  Is recommended now that he skip 

that dose after dialysis sessions altogether.





Additionally the patient did have hypoglycemia at the time of admission.  It is 

unclear exactly how much this contribute to his symptoms but is felt that it 

probably did have a impact.  He did not receive his nighttime insulin dose on 

the admission day.  The following morning he did receive his insulin dose as 

usual, but not his Tradjenta as it is non formulary here and he did not bring 

it with him.  Nonetheless he did have again at mid day today hypoglycemia in 

the 60s similar to his hypoglycemia in the 50s yesterday at the same time.  At 

this point will be decreasing his insulin dose as discussed below.





As the patient had chest discomfort and nausea vomiting with weakness and near 

syncope at the end of dialysis, and since he is diabetic and a renal failure 

patient with high risk for coronary disease, he underwent at treadmill stress 

testing here.  This test was unremarkable with 9 min of exercise, good heart 

rate and blood pressure responses in recovery, absence of anginal symptoms, 

absence of ischemic EKG changes.  This is a low risk test and unless more 

concerning cardiac sending symptoms developed further cardiac testing or risk 

stratification measures are not indicated at present





PENDING TEST RESULTS:


None





MEDICATION CHANGES:


He is instructed to not take his mid day hydralazine dose on dialysis days 

after the session


Insulin dose decreased to 18 units twice daily





FOLLOW-UP PLAN:


At dialysis clinic on his usual Monday Wednesday Friday schedule


With his primary diabetes manager.  He is instructed to keep careful monitoring 

of his midday sugars to be sure he is not having lows





Greater than 35 minutes bedside and care coordination time today